# Patient Record
Sex: FEMALE | Race: WHITE | HISPANIC OR LATINO | Employment: UNEMPLOYED | URBAN - METROPOLITAN AREA
[De-identification: names, ages, dates, MRNs, and addresses within clinical notes are randomized per-mention and may not be internally consistent; named-entity substitution may affect disease eponyms.]

---

## 2023-04-18 ENCOUNTER — ROUTINE PRENATAL (OUTPATIENT)
Dept: OBSTETRICS AND GYNECOLOGY | Facility: CLINIC | Age: 33
End: 2023-04-18
Payer: COMMERCIAL

## 2023-04-18 VITALS — SYSTOLIC BLOOD PRESSURE: 118 MMHG | DIASTOLIC BLOOD PRESSURE: 76 MMHG | WEIGHT: 202.8 LBS

## 2023-04-18 DIAGNOSIS — Z3A.12 12 WEEKS GESTATION OF PREGNANCY: ICD-10-CM

## 2023-04-18 DIAGNOSIS — Z34.81 PRENATAL CARE, SUBSEQUENT PREGNANCY IN FIRST TRIMESTER: Primary | ICD-10-CM

## 2023-04-18 LAB
GLUCOSE UR STRIP-MCNC: NEGATIVE MG/DL
PROT UR STRIP-MCNC: ABNORMAL MG/DL

## 2023-04-18 PROCEDURE — 0502F SUBSEQUENT PRENATAL CARE: CPT | Performed by: OBSTETRICS & GYNECOLOGY

## 2023-04-18 NOTE — PROGRESS NOTES
OB follow up     Jocelyn Luna is a 32 y.o.  12w4d being seen today for her obstetrical visit.  Patient reports no complaints. Fetal movement: Too early.    Her prenatal care is complicated by (and status): History of gestational diabetes, Rh-, rubella nonimmune    Review of Systems  Cramping/contractions : Occasional but no bleeding  Vaginal bleeding: Negative  Fetal movement too early    /76   Wt 92 kg (202 lb 12.8 oz)   LMP 2023     FHT:  162 BPM   Uterine Size: size equals dates       Assessment    Diagnoses and all orders for this visit:    1. Prenatal care, subsequent pregnancy in first trimester (Primary)  -     IgnapafF42 PLUS Core (chr21,18,13,sex) - Blood,    2. 12 weeks gestation of pregnancy  -     CepgooaY51 PLUS Core (chr21,18,13,sex) - Blood,        1) pregnancy at 12w4d         Plan    Reviewed this stage of pregnancy  Problem list updated   Follow up in 4 weeks.  We will get NIPT today.    Thomas Conley MD   2023  12:14 EDT

## 2023-04-22 LAB
CFDNA.FET/CFDNA.TOTAL SFR FETUS: NORMAL %
CITATION REF LAB TEST: NORMAL
FET 13+18+21+X+Y ANEUP PLAS.CFDNA: NEGATIVE
FET CHR 21 TS PLAS.CFDNA QL: NEGATIVE
FET SEX PLAS.CFDNA DOSAGE CFDNA: NORMAL
FET TS 13 RISK PLAS.CFDNA QL: NEGATIVE
FET TS 18 RISK WBC.DNA+CFDNA QL: NEGATIVE
GA EST FROM CONCEPTION DATE: NORMAL D
GESTATIONAL AGE > 9:: YES
LAB DIRECTOR NAME PROVIDER: NORMAL
LAB DIRECTOR NAME PROVIDER: NORMAL
LABORATORY COMMENT REPORT: NORMAL
LIMITATIONS OF THE TEST: NORMAL
NEGATIVE PREDICTIVE VALUE: NORMAL
NOTE: NORMAL
PERFORMANCE CHARACTERISTICS: NORMAL
POSITIVE PREDICTIVE VALUE: NORMAL
REF LAB TEST METHOD: NORMAL
TEST PERFORMANCE INFO SPEC: NORMAL

## 2023-04-25 ENCOUNTER — TELEPHONE (OUTPATIENT)
Dept: OBSTETRICS AND GYNECOLOGY | Facility: CLINIC | Age: 33
End: 2023-04-25
Payer: MEDICAID

## 2023-04-25 DIAGNOSIS — Z14.1 CYSTIC FIBROSIS CARRIER: Primary | ICD-10-CM

## 2023-04-25 DIAGNOSIS — Z14.8 CARRIER OF SPINAL MUSCULAR ATROPHY: ICD-10-CM

## 2023-04-25 NOTE — TELEPHONE ENCOUNTER
Pt is calling about inheritest core panel results she feels are wrong. Patient says in her last pregnancy she was not a carrier of cystic fibrosis or spinal muscular atrophy and would like to know if there is a possibility she could have this test repeat. She is requesting to discuss, pt says she cannot bring baby into world if there is a chance of cystic fibrosis or spinal muscular atrophy.     Please advise,   Thank you

## 2023-04-25 NOTE — TELEPHONE ENCOUNTER
I looked at her record from [facility] back in 2020, she did not have the inheritest completed. Did you want to talk with her prior to repeating the test?

## 2023-04-27 ENCOUNTER — TELEPHONE (OUTPATIENT)
Dept: OBSTETRICS AND GYNECOLOGY | Facility: CLINIC | Age: 33
End: 2023-04-27
Payer: MEDICAID

## 2023-04-27 ENCOUNTER — ROUTINE PRENATAL (OUTPATIENT)
Dept: OBSTETRICS AND GYNECOLOGY | Facility: CLINIC | Age: 33
End: 2023-04-27
Payer: MEDICAID

## 2023-04-27 VITALS — SYSTOLIC BLOOD PRESSURE: 118 MMHG | DIASTOLIC BLOOD PRESSURE: 84 MMHG | WEIGHT: 202.4 LBS

## 2023-04-27 DIAGNOSIS — Z34.82 PRENATAL CARE, SUBSEQUENT PREGNANCY IN SECOND TRIMESTER: Primary | ICD-10-CM

## 2023-04-27 DIAGNOSIS — Z14.1 CYSTIC FIBROSIS CARRIER: ICD-10-CM

## 2023-04-27 DIAGNOSIS — Z14.8 CARRIER OF SPINAL MUSCULAR ATROPHY: ICD-10-CM

## 2023-04-27 PROCEDURE — 0502F SUBSEQUENT PRENATAL CARE: CPT | Performed by: OBSTETRICS & GYNECOLOGY

## 2023-04-27 NOTE — TELEPHONE ENCOUNTER
Called [facility] genetic counseling at  [phone] to schedule appt for patient and FOB. Left voicemail with all information.    [facility] to call office and patient back within 24 hours.

## 2023-04-27 NOTE — PROGRESS NOTES
CC: Lab result counseling  Patient and her partner are seen with the aid of a video .  Cystic fibrosis carrier screening and SMA carrier screening was performed and patient is positive for both of these carrier status.  She is negative for Fragile X.  I explained to her the general nature of autosomal recessive traits and how they are past and general population carrier frequencies.  I explained to her that she is not unhealthy and that her partner would have about a 1 in 25 to 1 in 50 risk of being a carrier for either or both of these conditions.  The recommendation is for her partner to be tested to see if he is a carrier for either of these and to then direct formal counseling in that regard.  If he is not a carrier, then there is no chance of passage to offspring of the condition but that her offspring could still be carriers.  She voices understanding of this and we are working on getting her partner tested.

## 2023-05-01 ENCOUNTER — TELEPHONE (OUTPATIENT)
Dept: OBSTETRICS AND GYNECOLOGY | Facility: CLINIC | Age: 33
End: 2023-05-01

## 2023-05-01 RX ORDER — ONDANSETRON 4 MG/1
4 TABLET, FILM COATED ORAL DAILY PRN
Qty: 30 TABLET | Refills: 1 | Status: SHIPPED | OUTPATIENT
Start: 2023-05-01 | End: 2024-04-30

## 2023-05-01 NOTE — TELEPHONE ENCOUNTER
Provider: DR. LANCASTER  Caller: MARKEL BARRIOS  Relationship to Patient: SELF  Pharmacy:   [id number] - [place], [place] - [address] AT [facility] [address] [facility] - [phone]  - [phone] FX Phone:  [phone]   Fax:  [phone]        PHONE NUMBER: [phone]  Reason for Call: PT REQUESTING MEDICINE FOR NAUSEA.  When was the patient last seen: 4-27

## 2023-05-01 NOTE — TELEPHONE ENCOUNTER
Patient called Friday to the number provided for Gloria Colunga and left message. Left 2 messages again today. Not returning her call.

## 2023-05-16 ENCOUNTER — ROUTINE PRENATAL (OUTPATIENT)
Dept: OBSTETRICS AND GYNECOLOGY | Facility: CLINIC | Age: 33
End: 2023-05-16
Payer: COMMERCIAL

## 2023-05-16 VITALS
DIASTOLIC BLOOD PRESSURE: 72 MMHG | WEIGHT: 202.8 LBS | SYSTOLIC BLOOD PRESSURE: 132 MMHG | HEIGHT: 65 IN | BODY MASS INDEX: 33.79 KG/M2

## 2023-05-16 DIAGNOSIS — Z3A.16 16 WEEKS GESTATION OF PREGNANCY: ICD-10-CM

## 2023-05-16 DIAGNOSIS — Z14.8 CARRIER OF SPINAL MUSCULAR ATROPHY: ICD-10-CM

## 2023-05-16 DIAGNOSIS — Z34.82 PRENATAL CARE, SUBSEQUENT PREGNANCY IN SECOND TRIMESTER: Primary | ICD-10-CM

## 2023-05-16 DIAGNOSIS — Z14.1 CYSTIC FIBROSIS CARRIER: ICD-10-CM

## 2023-05-16 LAB
GLUCOSE UR STRIP-MCNC: NEGATIVE MG/DL
PROT UR STRIP-MCNC: NEGATIVE MG/DL

## 2023-05-16 NOTE — PROGRESS NOTES
"OB follow up     Jocelyn Lyon is a 32 y.o.  16w4d being seen today for her obstetrical visit.  Patient reports no complaints. Fetal movement: normal.  No complaints today.  Her  did get screening tests at [facility] and they are still pending.    Her prenatal care is complicated by (and status): CF carrier and SMA carrier    Review of Systems  Cramping/contractions : Negative  Vaginal bleeding: Negative  Fetal movement normal    /72   Ht 165.1 cm (65\")   Wt 92 kg (202 lb 12.8 oz)   LMP 2023   BMI 33.75 kg/m²     FHT:  140 BPM   Uterine Size: size equals dates       Assessment    Diagnoses and all orders for this visit:    1. Prenatal care, subsequent pregnancy in second trimester (Primary)  -     POC Urinalysis Dipstick    2. 16 weeks gestation of pregnancy  -     POC Urinalysis Dipstick    3. Cystic fibrosis carrier    4. Carrier of spinal muscular atrophy        1) pregnancy at 16w4d         Plan    Reviewed this stage of pregnancy  Problem list updated   Follow up in 4 weeks for her anatomy ultrasound.    Thomas Conley MD   2023  10:11 EDT    "

## 2023-05-22 ENCOUNTER — TELEPHONE (OUTPATIENT)
Dept: OBSTETRICS AND GYNECOLOGY | Facility: CLINIC | Age: 33
End: 2023-05-22
Payer: COMMERCIAL

## 2023-05-22 NOTE — TELEPHONE ENCOUNTER
Returned patient's call with [place] interpter to let her know that her 's blood work for the [facility] is still pending.     Informed her, I will call with results as soon as they are ready.

## 2023-05-22 NOTE — TELEPHONE ENCOUNTER
Caller: Jocelyn Horn    Relationship to patient: Self    Best call back number: [phone]    Patient is needing:     PT IS WANTING HER HUSBANDS LAB RESULTS (MRN [id number] ANDREY ENRIQUE)    PT STATES THEY HAVE BEEN WAITING  TWO WEEKS    PLEASE ADVISE     NEEDED  OKAY TO CALL ANY TIME  OKAY TO M

## 2023-06-13 ENCOUNTER — ROUTINE PRENATAL (OUTPATIENT)
Dept: OBSTETRICS AND GYNECOLOGY | Facility: CLINIC | Age: 33
End: 2023-06-13
Payer: COMMERCIAL

## 2023-06-13 VITALS — SYSTOLIC BLOOD PRESSURE: 114 MMHG | DIASTOLIC BLOOD PRESSURE: 72 MMHG | BODY MASS INDEX: 33.78 KG/M2 | WEIGHT: 203 LBS

## 2023-06-13 DIAGNOSIS — Z14.1 CYSTIC FIBROSIS CARRIER: ICD-10-CM

## 2023-06-13 DIAGNOSIS — Z14.8 CARRIER OF SPINAL MUSCULAR ATROPHY: ICD-10-CM

## 2023-06-13 DIAGNOSIS — Z3A.20 20 WEEKS GESTATION OF PREGNANCY: ICD-10-CM

## 2023-06-13 DIAGNOSIS — Z34.82 PRENATAL CARE, SUBSEQUENT PREGNANCY, SECOND TRIMESTER: Primary | ICD-10-CM

## 2023-06-13 LAB
GLUCOSE UR STRIP-MCNC: NEGATIVE MG/DL
PROT UR STRIP-MCNC: NEGATIVE MG/DL

## 2023-06-13 NOTE — PROGRESS NOTES
OB follow up     Jocelyn Lyon is a 32 y.o.  20w4d being seen today for her obstetrical visit.  Patient reports no complaints. Fetal movement: normal.  Ultrasound today with growth consistent with 19 weeks 4 days and suboptimal visualization of the face to be repeated on next visit.    Her prenatal care is complicated by (and status): Rh-, CF carrier    Review of Systems  Cramping/contractions : Negative  Vaginal bleeding: Negative  Fetal movement normal    /72   Wt 92.1 kg (203 lb)   LMP 2023   BMI 33.78 kg/m²     FHT: 152 BPM   Uterine Size: size equals dates       Assessment    Diagnoses and all orders for this visit:    1. Prenatal care, subsequent pregnancy, second trimester (Primary)  -     POC Urinalysis Dipstick    2. 20 weeks gestation of pregnancy    3. Carrier of spinal muscular atrophy    4. Cystic fibrosis carrier        1) pregnancy at 20w4d         Plan    Reviewed this stage of pregnancy  Problem list updated   Follow up in 4 weeks and we will do follow-up ultrasound of the face and do her 1 hour glucose screen on that visit.    Thomas Conley MD   2023  10:42 EDT

## 2023-07-12 PROBLEM — O26.899 RH NEGATIVE, ANTEPARTUM: Status: ACTIVE | Noted: 2023-07-12

## 2023-07-12 PROBLEM — Z67.91 RH NEGATIVE, ANTEPARTUM: Status: ACTIVE | Noted: 2023-07-12

## 2023-07-28 ENCOUNTER — TELEPHONE (OUTPATIENT)
Dept: OBSTETRICS AND GYNECOLOGY | Facility: CLINIC | Age: 33
End: 2023-07-28
Payer: COMMERCIAL

## 2023-08-09 ENCOUNTER — ROUTINE PRENATAL (OUTPATIENT)
Dept: OBSTETRICS AND GYNECOLOGY | Facility: CLINIC | Age: 33
End: 2023-08-09
Payer: COMMERCIAL

## 2023-08-09 VITALS — DIASTOLIC BLOOD PRESSURE: 80 MMHG | WEIGHT: 207.8 LBS | SYSTOLIC BLOOD PRESSURE: 128 MMHG | BODY MASS INDEX: 34.58 KG/M2

## 2023-08-09 DIAGNOSIS — O26.893 RH NEGATIVE STATUS DURING PREGNANCY IN THIRD TRIMESTER: ICD-10-CM

## 2023-08-09 DIAGNOSIS — Z3A.28 28 WEEKS GESTATION OF PREGNANCY: Primary | ICD-10-CM

## 2023-08-09 DIAGNOSIS — Z67.91 RH NEGATIVE STATUS DURING PREGNANCY IN THIRD TRIMESTER: ICD-10-CM

## 2023-08-09 LAB
ERYTHROCYTE [DISTWIDTH] IN BLOOD BY AUTOMATED COUNT: 12.5 % (ref 12.3–15.4)
GLUCOSE UR STRIP-MCNC: NEGATIVE MG/DL
HCT VFR BLD AUTO: 34.2 % (ref 34–46.6)
HGB BLD-MCNC: 11.4 G/DL (ref 12–15.9)
MCH RBC QN AUTO: 30 PG (ref 26.6–33)
MCHC RBC AUTO-ENTMCNC: 33.3 G/DL (ref 31.5–35.7)
MCV RBC AUTO: 90 FL (ref 79–97)
PLATELET # BLD AUTO: 293 10*3/MM3 (ref 140–450)
PROT UR STRIP-MCNC: NEGATIVE MG/DL
RBC # BLD AUTO: 3.8 10*6/MM3 (ref 3.77–5.28)
WBC # BLD AUTO: 11.14 10*3/MM3 (ref 3.4–10.8)

## 2023-08-09 NOTE — PROGRESS NOTES
Chief Complaint   Patient presents with    Routine Prenatal Visit     28w5d      Jocelyn Lyon is a 32 y.o.  at 28w5d  here for routine OB visit  Reports that she is still being asked to pay a large bill from "[employer]"    /80   Wt 94.3 kg (207 lb 12.8 oz)   LMP 2023   Breastfeeding No   BMI 34.58 kg/mý    Gen: NAD, well appearing  Abd: nontender    See OB Flowsheet    ASSESSMENT/PLAN:  (Z3A.28) 28 weeks gestation of pregnancy - Plan: POC Urinalysis Dipstick, CBC (No Diff), Antibody Screen    (O26.893,  Z67.91) Rh negative status during pregnancy in third trimester - Plan: Antibody Screen    Will get AB screen and CBC prior to Rhogam.  She was walked to "[employer]" and I explained that they have billing questions to phlebotomist.      Return in about 2 weeks (around 2023).

## 2023-08-10 LAB — BLD GP AB SCN SERPL QL: NEGATIVE

## 2023-08-18 ENCOUNTER — TELEPHONE (OUTPATIENT)
Dept: OBSTETRICS AND GYNECOLOGY | Facility: CLINIC | Age: 33
End: 2023-08-18
Payer: COMMERCIAL

## 2023-08-18 ENCOUNTER — HOSPITAL ENCOUNTER (EMERGENCY)
Facility: HOSPITAL | Age: 33
Discharge: HOME | End: 2023-08-18
Attending: OBSTETRICS & GYNECOLOGY | Admitting: OBSTETRICS & GYNECOLOGY
Payer: COMMERCIAL

## 2023-08-18 VITALS
SYSTOLIC BLOOD PRESSURE: 125 MMHG | HEIGHT: 66 IN | OXYGEN SATURATION: 97 % | BODY MASS INDEX: 33.27 KG/M2 | WEIGHT: 207 LBS | TEMPERATURE: 98.9 F | HEART RATE: 116 BPM | DIASTOLIC BLOOD PRESSURE: 67 MMHG | RESPIRATION RATE: 18 BRPM

## 2023-08-18 PROCEDURE — 99283 EMERGENCY DEPT VISIT LOW MDM: CPT | Performed by: OBSTETRICS & GYNECOLOGY

## 2023-08-18 PROCEDURE — 59025 FETAL NON-STRESS TEST: CPT

## 2023-08-18 NOTE — NURSING NOTE
Patient given D/C instructions and verbalized understanding. Patient took belongings and ambulated off unit.

## 2023-08-18 NOTE — TELEPHONE ENCOUNTER
With a fall especially on the abdomen I would recommend going to L&D for NST and examination as if something urgent is needed this would be the ideal location. I will let L&D know, please call patient. Thanks!

## 2023-08-18 NOTE — TELEPHONE ENCOUNTER
PTS SISTER IS CALLING BACK IN - PT IS HAVING A LOT OF PAIN AND WOULD LIKE TO KNOW IF SHE CAN COME IN FOR AN U/S AND BE CHECKED OUT SHE DENIES DECREASED FETAL MOVEMENT  IS NOT PLEASE CALL PT TO ADVISE
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
## 2023-08-23 ENCOUNTER — ROUTINE PRENATAL (OUTPATIENT)
Dept: OBSTETRICS AND GYNECOLOGY | Facility: CLINIC | Age: 33
End: 2023-08-23
Payer: COMMERCIAL

## 2023-08-23 VITALS — WEIGHT: 210.4 LBS | DIASTOLIC BLOOD PRESSURE: 77 MMHG | SYSTOLIC BLOOD PRESSURE: 126 MMHG | BODY MASS INDEX: 33.96 KG/M2

## 2023-08-23 DIAGNOSIS — Z3A.30 30 WEEKS GESTATION OF PREGNANCY: Primary | ICD-10-CM

## 2023-08-23 LAB
GLUCOSE UR STRIP-MCNC: NEGATIVE MG/DL
PROT UR STRIP-MCNC: ABNORMAL MG/DL

## 2023-08-23 NOTE — PROGRESS NOTES
Chief Complaint   Patient presents with    Routine Prenatal Visit     30w5d      Jocelyn Lyon is a 32 y.o.  at 30w5d  here for routine OB visit  Reports leg pain is much better since ER visit. She has had no vaginal bleeding or contractions    /77   Wt 95.4 kg (210 lb 6.4 oz)   LMP 2023   BMI 33.96 kg/mý        Gen: NAD, well appearing  Abd: nontender    See OB Flowsheet    ASSESSMENT/PLAN:  (Z3A.30) 30 weeks gestation of pregnancy - Plan: POC Urinalysis Dipstick    (O32.1XX0) Maternal care for breech presentation, single gestation    Still worried about bill for spouse carrier screening.  Will check with lab.  Reviewed growth US next visit for BMI  Tdap today  Routine counseling pertinent to this stage of pregnancy was reviewed including labor precautions  Return in about 2 weeks (around 2023) for growth US, OB visit.

## 2023-09-08 ENCOUNTER — ROUTINE PRENATAL (OUTPATIENT)
Dept: OBSTETRICS AND GYNECOLOGY | Facility: CLINIC | Age: 33
End: 2023-09-08
Payer: COMMERCIAL

## 2023-09-08 VITALS — WEIGHT: 210.2 LBS | BODY MASS INDEX: 33.93 KG/M2 | DIASTOLIC BLOOD PRESSURE: 79 MMHG | SYSTOLIC BLOOD PRESSURE: 112 MMHG

## 2023-09-08 DIAGNOSIS — Z3A.33 33 WEEKS GESTATION OF PREGNANCY: Primary | ICD-10-CM

## 2023-09-08 LAB
GLUCOSE UR STRIP-MCNC: NEGATIVE MG/DL
PROT UR STRIP-MCNC: NEGATIVE MG/DL

## 2023-09-08 NOTE — PROGRESS NOTES
Chief Complaint   Patient presents with    Routine Prenatal Visit     33w      Jocelyn Lyon is a 32 y.o.  at 33w0d  here for routine OB visit  Reports a reaction to Tdap causing lots of fatigue that was short in duration. I explained that this can be a side effect but she does not sound like she had an allergy - no rash/skin changes/SOA.      /79   Wt 95.3 kg (210 lb 3.2 oz)   LMP 2023   BMI 33.93 kg/m²    Fetal Heart Rate: +  Movement: Present  Presentation: Vertex   Gen: NAD, well appearing  Abd: nontender    See OB Flowsheet    ASSESSMENT/PLAN:  (Z3A.33) 33 weeks gestation of pregnancy - Plan: POC Urinalysis Dipstick    Reviewed US which is within normal limitsn and vertex  Routine counseling pertinent to this stage of pregnancy was reviewed including third trimester precautions  Follow up as scheduled

## 2023-09-12 ENCOUNTER — TELEPHONE (OUTPATIENT)
Dept: OBSTETRICS AND GYNECOLOGY | Facility: CLINIC | Age: 33
End: 2023-09-12
Payer: COMMERCIAL

## 2023-09-12 NOTE — TELEPHONE ENCOUNTER
Provider: EMILY    Caller: MARKEL BARRIOS    Relationship to Patient: SELF    Phone Number: [phone] CALL ANYTIME, IT IS OKAY TO LVM.    Reason for Call: PATIENT'S CHILD TESTED POSITIVE FOR COVID 09/12/23. PATIENT ALSO HAS A RUNNY NOSE AND COUGH. PATIENT HAS NOT TAKEN A COVID TEST BUT HAS THE SAME SYMPTOMS AS CHILD WHO TESTED POSITIVE. PATIENT DOES NOT HAVE A FEVER.    PATIENT IS REQUESTING TO SPEAK WITH CLINICAL TO DISCUSS IF APPT ON 09/20/23 NEEDS TO BE RESCHEDULED. ARE THERE ANY CONCERNS TO WATCH FOR WITH COVID WHILE PREGNANT?

## 2023-09-18 ENCOUNTER — TELEPHONE (OUTPATIENT)
Dept: OBSTETRICS AND GYNECOLOGY | Facility: CLINIC | Age: 33
End: 2023-09-18
Payer: COMMERCIAL

## 2023-09-18 NOTE — TELEPHONE ENCOUNTER
Tried to contact patient with  regarding [facility] bill. Left voicemail for patient to return call to office.     with [facility] should be correcting the bill/invoice.

## 2023-09-20 ENCOUNTER — ROUTINE PRENATAL (OUTPATIENT)
Dept: OBSTETRICS AND GYNECOLOGY | Facility: CLINIC | Age: 33
End: 2023-09-20
Payer: COMMERCIAL

## 2023-09-20 VITALS — BODY MASS INDEX: 33.99 KG/M2 | DIASTOLIC BLOOD PRESSURE: 75 MMHG | SYSTOLIC BLOOD PRESSURE: 112 MMHG | WEIGHT: 210.6 LBS

## 2023-09-20 DIAGNOSIS — Z3A.34 34 WEEKS GESTATION OF PREGNANCY: Primary | ICD-10-CM

## 2023-09-20 LAB
GLUCOSE UR STRIP-MCNC: NEGATIVE MG/DL
PROT UR STRIP-MCNC: NEGATIVE MG/DL

## 2023-09-20 NOTE — PROGRESS NOTES
Chief Complaint   Patient presents with    Routine Prenatal Visit     34w5d      Jocelyn Lyon is a 32 y.o.  at 34w5d  here for routine OB visit  Reports that she has had some contractions, not super frequent - very spaced out  Her baby had COVID 2 weeks ago, everyone is feeling better. She never had a fever    /75   Wt 95.5 kg (210 lb 9.6 oz)   LMP 2023   BMI 33.99 kg/m²    Fetal Heart Rate: +  Movement: Present   Gen: NAD, well appearing  Abd: nontender    See OB Flowsheet    ASSESSMENT/PLAN:  (Z3A.34) 34 weeks gestation of pregnancy - Plan: POC Urinalysis Dipstick    Routine counseling pertinent to this stage of pregnancy was reviewed including  labor precautions, infection precautions  Return in about 1 week (around 2023).

## 2023-09-27 ENCOUNTER — ROUTINE PRENATAL (OUTPATIENT)
Dept: OBSTETRICS AND GYNECOLOGY | Facility: CLINIC | Age: 33
End: 2023-09-27
Payer: COMMERCIAL

## 2023-09-27 VITALS — WEIGHT: 210 LBS | DIASTOLIC BLOOD PRESSURE: 79 MMHG | SYSTOLIC BLOOD PRESSURE: 125 MMHG | BODY MASS INDEX: 33.89 KG/M2

## 2023-09-27 DIAGNOSIS — Z3A.35 35 WEEKS GESTATION OF PREGNANCY: Primary | ICD-10-CM

## 2023-09-27 DIAGNOSIS — R63.8 DECELERATION IN WEIGHT GAIN: ICD-10-CM

## 2023-09-27 NOTE — PROGRESS NOTES
Chief Complaint   Patient presents with    Routine Prenatal Visit     35w5d      Jocelyn Lyon is a 32 y.o.  at 35w5d  here for routine OB visit  Reports that she has not had increased weight since 30 weeks  Feels contractions a couple times per day    /79   Wt 95.3 kg (210 lb)   LMP 2023   BMI 33.89 kg/m²    Fetal Heart Rate: 155  Movement: Present  Presentation: Vertex   Gen: NAD, well appearing  Abd: nontender    See OB Flowsheet    ASSESSMENT/PLAN:  (Z3A.35) 35 weeks gestation of pregnancy - Plan: POC Urinalysis Dipstick, US Ob Follow Up Transabdominal Approach    (R63.8) Deceleration in weight gain - Plan: US Ob Follow Up Transabdominal Approach    Reviewed growth US was within normal limits earlier this month  Can repeat in early October  Declined flu as she had a reaction to Tdap of fatigue  Routine counseling pertinent to this stage of pregnancy was reviewed including labor precuations  Return in about 1 week (around 10/4/2023) for growth US, OB visit.

## 2023-10-04 ENCOUNTER — ROUTINE PRENATAL (OUTPATIENT)
Dept: OBSTETRICS AND GYNECOLOGY | Facility: CLINIC | Age: 33
End: 2023-10-04
Payer: COMMERCIAL

## 2023-10-04 VITALS — SYSTOLIC BLOOD PRESSURE: 126 MMHG | BODY MASS INDEX: 34.12 KG/M2 | WEIGHT: 211.4 LBS | DIASTOLIC BLOOD PRESSURE: 83 MMHG

## 2023-10-04 DIAGNOSIS — Z3A.36 36 WEEKS GESTATION OF PREGNANCY: Primary | ICD-10-CM

## 2023-10-04 LAB
GLUCOSE UR STRIP-MCNC: NEGATIVE MG/DL
PROT UR STRIP-MCNC: NEGATIVE MG/DL

## 2023-10-04 NOTE — PROGRESS NOTES
Chief Complaint   Patient presents with    Routine Prenatal Visit     36w5d      Jocelyn Lyon is a 32 y.o.  at 36w5d  here for routine OB visit  Reports some irregular contractions    /83   Wt 95.9 kg (211 lb 6.4 oz)   LMP 2023   BMI 34.12 kg/m²    Fetal Heart Rate: +  Movement: Present  Presentation: Vertex  Dilation: Closed   Gen: NAD, well appearing  Abd: nontender  Pelvic: normal external genitalia, normal vagina    See OB Flowsheet    ASSESSMENT/PLAN:  (Z3A.36) 36 weeks gestation of pregnancy - Plan: POC Urinalysis Dipstick, Strep B Screen - Swab, Vaginal/Rectum  Reviewed GBS swab, growth US within normal limits   Routine counseling pertinent to this stage of pregnancy was reviewed including labor precautions  Return in about 1 week (around 10/11/2023).

## 2023-10-06 LAB — GP B STREP DNA SPEC QL NAA+PROBE: NEGATIVE

## 2023-10-11 ENCOUNTER — ROUTINE PRENATAL (OUTPATIENT)
Dept: OBSTETRICS AND GYNECOLOGY | Facility: CLINIC | Age: 33
End: 2023-10-11
Payer: COMMERCIAL

## 2023-10-11 VITALS — DIASTOLIC BLOOD PRESSURE: 82 MMHG | SYSTOLIC BLOOD PRESSURE: 116 MMHG | WEIGHT: 213 LBS | BODY MASS INDEX: 34.38 KG/M2

## 2023-10-11 DIAGNOSIS — Z3A.37 37 WEEKS GESTATION OF PREGNANCY: Primary | ICD-10-CM

## 2023-10-11 DIAGNOSIS — R03.0 ELEVATED BLOOD PRESSURE READING: ICD-10-CM

## 2023-10-11 LAB
GLUCOSE UR STRIP-MCNC: NEGATIVE MG/DL
PROT UR STRIP-MCNC: ABNORMAL MG/DL

## 2023-10-11 NOTE — PROGRESS NOTES
Chief Complaint   Patient presents with    Routine Prenatal Visit     37w5d      Jocelyn Lyon is a 32 y.o.  at 37w5d  here for routine OB visit  Reports no major issues. She denies HA/vision changes.  Reports some swelling in her hands and feet yesterday after walking and doing a lot of house chores but otherwise doing well.       /82   Wt 96.6 kg (213 lb)   LMP 2023   BMI 34.38 kg/mý    Fetal Heart Rate: 130  Movement: Present  Dilation: 1   Gen: NAD, well appearing  Abd: nontender  Pelvic: normal external genitalia     See OB Flowsheet    ASSESSMENT/PLAN:  (Z3A.37) 37 weeks gestation of pregnancy - Plan: POC Urinalysis Dipstick, CBC (No Diff), Comprehensive Metabolic Panel, Protein / Creatinine Ratio, Urine - Urine, Clean Catch    (R03.0) Elevated blood pressure reading    Reviewed preeclampsia precautions with patient. She had an isolated elevated systolic that was 116/82 on repeat.  We will get labs  Reviewed number for [facility]  Routine counseling pertinent to this stage of pregnancy was reviewed including labor precautions, aware GBS was negative  Return in about 1 week (around 10/18/2023).

## 2023-10-12 LAB
ALBUMIN SERPL-MCNC: 3.7 G/DL (ref 3.5–5.2)
ALBUMIN/GLOB SERPL: 1.6 G/DL
ALP SERPL-CCNC: 159 U/L (ref 39–117)
ALT SERPL-CCNC: 23 U/L (ref 1–33)
AST SERPL-CCNC: 20 U/L (ref 1–32)
BILIRUB SERPL-MCNC: 0.2 MG/DL (ref 0–1.2)
BUN SERPL-MCNC: 11 MG/DL (ref 6–20)
BUN/CREAT SERPL: 13.8 (ref 7–25)
CALCIUM SERPL-MCNC: 9.4 MG/DL (ref 8.6–10.5)
CHLORIDE SERPL-SCNC: 105 MMOL/L (ref 98–107)
CO2 SERPL-SCNC: 25.7 MMOL/L (ref 22–29)
CREAT SERPL-MCNC: 0.8 MG/DL (ref 0.57–1)
CREAT UR-MCNC: 85.5 MG/DL
EGFRCR SERPLBLD CKD-EPI 2021: 101.8 ML/MIN/1.73
ERYTHROCYTE [DISTWIDTH] IN BLOOD BY AUTOMATED COUNT: 13 % (ref 12.3–15.4)
GLOBULIN SER CALC-MCNC: 2.3 GM/DL
GLUCOSE SERPL-MCNC: 111 MG/DL (ref 65–99)
HCT VFR BLD AUTO: 38.4 % (ref 34–46.6)
HGB BLD-MCNC: 13 G/DL (ref 12–15.9)
MCH RBC QN AUTO: 29.7 PG (ref 26.6–33)
MCHC RBC AUTO-ENTMCNC: 33.9 G/DL (ref 31.5–35.7)
MCV RBC AUTO: 87.9 FL (ref 79–97)
PLATELET # BLD AUTO: 259 10*3/MM3 (ref 140–450)
POTASSIUM SERPL-SCNC: 4.1 MMOL/L (ref 3.5–5.2)
PROT SERPL-MCNC: 6 G/DL (ref 6–8.5)
PROT UR-MCNC: 24.6 MG/DL
PROT/CREAT UR: 287.7 MG/G CREA (ref 0–200)
RBC # BLD AUTO: 4.37 10*6/MM3 (ref 3.77–5.28)
SODIUM SERPL-SCNC: 139 MMOL/L (ref 136–145)
WBC # BLD AUTO: 10.24 10*3/MM3 (ref 3.4–10.8)

## 2023-10-18 ENCOUNTER — ROUTINE PRENATAL (OUTPATIENT)
Dept: OBSTETRICS AND GYNECOLOGY | Facility: CLINIC | Age: 33
End: 2023-10-18
Payer: COMMERCIAL

## 2023-10-18 VITALS — SYSTOLIC BLOOD PRESSURE: 131 MMHG | WEIGHT: 213.2 LBS | DIASTOLIC BLOOD PRESSURE: 84 MMHG | BODY MASS INDEX: 34.41 KG/M2

## 2023-10-18 DIAGNOSIS — Z3A.38 38 WEEKS GESTATION OF PREGNANCY: Primary | ICD-10-CM

## 2023-10-18 LAB
GLUCOSE UR STRIP-MCNC: NEGATIVE MG/DL
PROT UR STRIP-MCNC: NEGATIVE MG/DL

## 2023-10-18 RX ORDER — SODIUM CHLORIDE 0.9 % (FLUSH) 0.9 %
10 SYRINGE (ML) INJECTION EVERY 12 HOURS SCHEDULED
OUTPATIENT
Start: 2023-10-18

## 2023-10-18 RX ORDER — CARBOPROST TROMETHAMINE 250 UG/ML
250 INJECTION, SOLUTION INTRAMUSCULAR AS NEEDED
OUTPATIENT
Start: 2023-10-18

## 2023-10-18 RX ORDER — SODIUM CHLORIDE 9 MG/ML
40 INJECTION, SOLUTION INTRAVENOUS AS NEEDED
OUTPATIENT
Start: 2023-10-18

## 2023-10-18 RX ORDER — SODIUM CHLORIDE 0.9 % (FLUSH) 0.9 %
10 SYRINGE (ML) INJECTION AS NEEDED
OUTPATIENT
Start: 2023-10-18

## 2023-10-18 RX ORDER — LIDOCAINE HYDROCHLORIDE 10 MG/ML
0.5 INJECTION, SOLUTION INFILTRATION; PERINEURAL ONCE AS NEEDED
OUTPATIENT
Start: 2023-10-18

## 2023-10-18 RX ORDER — MAGNESIUM CARB/ALUMINUM HYDROX 105-160MG
30 TABLET,CHEWABLE ORAL ONCE
OUTPATIENT
Start: 2023-10-18 | End: 2023-10-18

## 2023-10-18 RX ORDER — OXYTOCIN/0.9 % SODIUM CHLORIDE 30/500 ML
999 PLASTIC BAG, INJECTION (ML) INTRAVENOUS ONCE
OUTPATIENT
Start: 2023-10-18

## 2023-10-18 RX ORDER — ACETAMINOPHEN 325 MG/1
650 TABLET ORAL EVERY 4 HOURS PRN
OUTPATIENT
Start: 2023-10-18

## 2023-10-18 RX ORDER — MISOPROSTOL 100 UG/1
25 TABLET ORAL EVERY 4 HOURS PRN
OUTPATIENT
Start: 2023-10-18

## 2023-10-18 RX ORDER — MISOPROSTOL 100 UG/1
800 TABLET ORAL AS NEEDED
OUTPATIENT
Start: 2023-10-18

## 2023-10-18 RX ORDER — METHYLERGONOVINE MALEATE 0.2 MG/ML
200 INJECTION INTRAVENOUS ONCE AS NEEDED
OUTPATIENT
Start: 2023-10-18

## 2023-10-18 RX ORDER — OXYTOCIN/0.9 % SODIUM CHLORIDE 30/500 ML
250 PLASTIC BAG, INJECTION (ML) INTRAVENOUS CONTINUOUS
OUTPATIENT
Start: 2023-10-18 | End: 2023-10-18

## 2023-10-18 RX ORDER — SODIUM CHLORIDE, SODIUM LACTATE, POTASSIUM CHLORIDE, CALCIUM CHLORIDE 600; 310; 30; 20 MG/100ML; MG/100ML; MG/100ML; MG/100ML
125 INJECTION, SOLUTION INTRAVENOUS CONTINUOUS
OUTPATIENT
Start: 2023-10-18

## 2023-10-18 NOTE — PROGRESS NOTES
Chief Complaint   Patient presents with    Routine Prenatal Visit     38w5d      Jocelyn Lyon is a 32 y.o.  at 38w5d  here for routine OB visit  Reports no issues    /84   Wt 96.7 kg (213 lb 3.2 oz)   LMP 2023   BMI 34.41 kg/m²        Gen: NAD, well appearing  Abd: nontender  Pelvic: normal ext genitalia    See OB Flowsheet    ASSESSMENT/PLAN:  (Z3A.38) 38 weeks gestation of pregnancy - Plan: POC Urinalysis Dipstick    Reviewed risks/benefits of IOL. Would like around 39-40 weeks, will schedule  Routine counseling pertinent to this stage of pregnancy was reviewed including labor precautions  Return in about 1 week (around 10/25/2023).

## 2023-10-24 ENCOUNTER — TELEPHONE (OUTPATIENT)
Dept: OBSTETRICS AND GYNECOLOGY | Facility: CLINIC | Age: 33
End: 2023-10-24
Payer: COMMERCIAL

## 2023-10-24 NOTE — TELEPHONE ENCOUNTER
Spoke with pt via  she is aware of induction at 5pm tomorrow and to call L&D at 3pm to make sure a bed is available before showing up.     Nola Perry

## 2023-10-27 ENCOUNTER — TELEPHONE (OUTPATIENT)
Dept: OBSTETRICS AND GYNECOLOGY | Facility: CLINIC | Age: 33
End: 2023-10-27

## 2023-10-27 ENCOUNTER — ROUTINE PRENATAL (OUTPATIENT)
Dept: OBSTETRICS AND GYNECOLOGY | Facility: CLINIC | Age: 33
End: 2023-10-27
Payer: COMMERCIAL

## 2023-10-27 VITALS — WEIGHT: 213 LBS | BODY MASS INDEX: 34.38 KG/M2 | SYSTOLIC BLOOD PRESSURE: 120 MMHG | DIASTOLIC BLOOD PRESSURE: 83 MMHG

## 2023-10-27 DIAGNOSIS — Z34.80 SUPERVISION OF OTHER NORMAL PREGNANCY, ANTEPARTUM: Primary | ICD-10-CM

## 2023-10-27 NOTE — TELEPHONE ENCOUNTER
"Cale-  Can you please reach out to her for me? I just spoke with the hospital again. Unfortunately they are very full still and have no estimated time of when they will be having room.  I had recommended that she come in for a visit this week in case this happens, but it appears it was cancelled (says \"(Patient)\"). If she would like to come in before 3 today I will [facility].  Please let her know I will not be in the hospital this weekend.  She is welcome to stay \"in line\" for her induction, but I can also see if they have space/time next Wednesday as I will be in the hospital on Thursday.  Thanks!  "

## 2023-10-27 NOTE — TELEPHONE ENCOUNTER
----- Message from Jocelyn Lyon sent at 10/27/2023  1:10 AM EDT -----  Regarding: Hello  Contact: [phone]  Mckayla, good evening, this is your patient, Jocelyn Luna. I am very worried about the issue of my labor induction. To date, the hospital has not called me and that is not a normal thing. I hope you respond when you can and make a new appointment to see me. I don't know how. My baby is here because I don't check myself this week, thank you and have a nice night.

## 2023-10-27 NOTE — PROGRESS NOTES
Chief Complaint   Patient presents with    Routine Prenatal Visit      Jocelyn Lyon is a 32 y.o.  at 40w0d  here for routine OB visit  Reports that she would prefer to defer her induction until next week if she has no other indication.   Would like ultrasound today to check fluid    /83   Wt 96.6 kg (213 lb)   LMP 2023   BMI 34.38 kg/m²        Gen: NAD, well appearing  Abd: nontender    See OB Flowsheet    ASSESSMENT/PLAN:  (Z34.80) Supervision of other normal pregnancy, antepartum    BPP today   Reviewed labor and fetal movement precautions.   Plan for IOL Wednesday AM at 7AM unless earlier indication arises.    No follow-ups on file.

## 2023-11-01 ENCOUNTER — ANESTHESIA (OUTPATIENT)
Dept: LABOR AND DELIVERY | Facility: HOSPITAL | Age: 33
End: 2023-11-01
Payer: COMMERCIAL

## 2023-11-01 ENCOUNTER — HOSPITAL ENCOUNTER (INPATIENT)
Facility: HOSPITAL | Age: 33
LOS: 2 days | Discharge: HOME | End: 2023-11-03
Attending: OBSTETRICS & GYNECOLOGY | Admitting: OBSTETRICS & GYNECOLOGY
Payer: COMMERCIAL

## 2023-11-01 ENCOUNTER — HOSPITAL ENCOUNTER (INPATIENT)
Dept: LABOR AND DELIVERY | Facility: HOSPITAL | Age: 33
Discharge: HOME | End: 2023-11-01
Payer: COMMERCIAL

## 2023-11-01 ENCOUNTER — ANESTHESIA EVENT (OUTPATIENT)
Dept: LABOR AND DELIVERY | Facility: HOSPITAL | Age: 33
End: 2023-11-01
Payer: COMMERCIAL

## 2023-11-01 DIAGNOSIS — Z3A.38 38 WEEKS GESTATION OF PREGNANCY: ICD-10-CM

## 2023-11-01 PROBLEM — Z3A.39 39 WEEKS GESTATION OF PREGNANCY: Status: ACTIVE | Noted: 2023-11-01

## 2023-11-01 LAB
ABO GROUP BLD: NORMAL
ABO GROUP BLD: NORMAL
ALBUMIN SERPL-MCNC: 3.2 G/DL (ref 3.5–5.2)
ALBUMIN/GLOB SERPL: 1.1 G/DL
ALP SERPL-CCNC: 157 U/L (ref 39–117)
ALT SERPL W P-5'-P-CCNC: 23 U/L (ref 1–33)
ANION GAP SERPL CALCULATED.3IONS-SCNC: 11.3 MMOL/L (ref 5–15)
AST SERPL-CCNC: 20 U/L (ref 1–32)
BILIRUB SERPL-MCNC: 0.3 MG/DL (ref 0–1.2)
BLD GP AB SCN SERPL QL: NEGATIVE
BUN SERPL-MCNC: 9 MG/DL (ref 6–20)
BUN/CREAT SERPL: 15.8 (ref 7–25)
CALCIUM SPEC-SCNC: 9.1 MG/DL (ref 8.6–10.5)
CHLORIDE SERPL-SCNC: 104 MMOL/L (ref 98–107)
CO2 SERPL-SCNC: 19.7 MMOL/L (ref 22–29)
CREAT SERPL-MCNC: 0.57 MG/DL (ref 0.57–1)
CREAT UR-MCNC: 101.5 MG/DL
DEPRECATED RDW RBC AUTO: 41.4 FL (ref 37–54)
EGFRCR SERPLBLD CKD-EPI 2021: 125.6 ML/MIN/1.73
ERYTHROCYTE [DISTWIDTH] IN BLOOD BY AUTOMATED COUNT: 13 % (ref 12.3–15.4)
FETAL BLEED: NEGATIVE
GLOBULIN UR ELPH-MCNC: 3 GM/DL
GLUCOSE SERPL-MCNC: 124 MG/DL (ref 65–99)
HCT VFR BLD AUTO: 38.5 % (ref 34–46.6)
HGB BLD-MCNC: 13 G/DL (ref 12–15.9)
LDH SERPL-CCNC: 203 U/L (ref 135–214)
MCH RBC QN AUTO: 29.3 PG (ref 26.6–33)
MCHC RBC AUTO-ENTMCNC: 33.8 G/DL (ref 31.5–35.7)
MCV RBC AUTO: 86.9 FL (ref 79–97)
NUMBER OF DOSES: NORMAL
PLATELET # BLD AUTO: 244 10*3/MM3 (ref 140–450)
PMV BLD AUTO: 10.4 FL (ref 6–12)
POTASSIUM SERPL-SCNC: 3.4 MMOL/L (ref 3.5–5.2)
PROT ?TM UR-MCNC: 28.8 MG/DL
PROT SERPL-MCNC: 6.2 G/DL (ref 6–8.5)
PROT/CREAT UR: 283.7 MG/G CREA (ref 0–200)
RBC # BLD AUTO: 4.43 10*6/MM3 (ref 3.77–5.28)
RH BLD: NEGATIVE
RH BLD: NEGATIVE
SODIUM SERPL-SCNC: 135 MMOL/L (ref 136–145)
T&S EXPIRATION DATE: NORMAL
URATE SERPL-MCNC: 4.5 MG/DL (ref 2.4–5.7)
WBC NRBC COR # BLD: 8.74 10*3/MM3 (ref 3.4–10.8)

## 2023-11-01 PROCEDURE — 85461 HEMOGLOBIN FETAL: CPT | Performed by: OBSTETRICS & GYNECOLOGY

## 2023-11-01 PROCEDURE — 86900 BLOOD TYPING SEROLOGIC ABO: CPT | Performed by: OBSTETRICS & GYNECOLOGY

## 2023-11-01 PROCEDURE — 86901 BLOOD TYPING SEROLOGIC RH(D): CPT | Performed by: OBSTETRICS & GYNECOLOGY

## 2023-11-01 PROCEDURE — 86850 RBC ANTIBODY SCREEN: CPT | Performed by: OBSTETRICS & GYNECOLOGY

## 2023-11-01 PROCEDURE — 0HQ9XZZ REPAIR PERINEUM SKIN, EXTERNAL APPROACH: ICD-10-PCS | Performed by: OBSTETRICS & GYNECOLOGY

## 2023-11-01 PROCEDURE — 83615 LACTATE (LD) (LDH) ENZYME: CPT | Performed by: OBSTETRICS & GYNECOLOGY

## 2023-11-01 PROCEDURE — 82570 ASSAY OF URINE CREATININE: CPT | Performed by: OBSTETRICS & GYNECOLOGY

## 2023-11-01 PROCEDURE — 10907ZC DRAINAGE OF AMNIOTIC FLUID, THERAPEUTIC FROM PRODUCTS OF CONCEPTION, VIA NATURAL OR ARTIFICIAL OPENING: ICD-10-PCS | Performed by: OBSTETRICS & GYNECOLOGY

## 2023-11-01 PROCEDURE — C1755 CATHETER, INTRASPINAL: HCPCS | Performed by: STUDENT IN AN ORGANIZED HEALTH CARE EDUCATION/TRAINING PROGRAM

## 2023-11-01 PROCEDURE — 59410 OBSTETRICAL CARE: CPT | Performed by: OBSTETRICS & GYNECOLOGY

## 2023-11-01 PROCEDURE — 84550 ASSAY OF BLOOD/URIC ACID: CPT | Performed by: OBSTETRICS & GYNECOLOGY

## 2023-11-01 PROCEDURE — 25810000003 LACTATED RINGERS PER 1000 ML: Performed by: OBSTETRICS & GYNECOLOGY

## 2023-11-01 PROCEDURE — 84156 ASSAY OF PROTEIN URINE: CPT | Performed by: OBSTETRICS & GYNECOLOGY

## 2023-11-01 PROCEDURE — 80053 COMPREHEN METABOLIC PANEL: CPT | Performed by: OBSTETRICS & GYNECOLOGY

## 2023-11-01 PROCEDURE — 85027 COMPLETE CBC AUTOMATED: CPT | Performed by: OBSTETRICS & GYNECOLOGY

## 2023-11-01 RX ORDER — DOCUSATE SODIUM 100 MG/1
100 CAPSULE, LIQUID FILLED ORAL 2 TIMES DAILY
Status: DISCONTINUED | OUTPATIENT
Start: 2023-11-01 | End: 2023-11-03 | Stop reason: HOSPADM

## 2023-11-01 RX ORDER — FENTANYL CIT 0.2 MG/100ML-ROPIV 0.2%-NACL 0.9% EPIDURAL INJ 2/0.2 MCG/ML-%
10 SOLUTION INJECTION CONTINUOUS
Status: DISCONTINUED | OUTPATIENT
Start: 2023-11-01 | End: 2023-11-01

## 2023-11-01 RX ORDER — SODIUM CHLORIDE 0.9 % (FLUSH) 0.9 %
1-10 SYRINGE (ML) INJECTION AS NEEDED
Status: DISCONTINUED | OUTPATIENT
Start: 2023-11-01 | End: 2023-11-03 | Stop reason: HOSPADM

## 2023-11-01 RX ORDER — OXYTOCIN/0.9 % SODIUM CHLORIDE 30/500 ML
250 PLASTIC BAG, INJECTION (ML) INTRAVENOUS CONTINUOUS
Status: ACTIVE | OUTPATIENT
Start: 2023-11-01 | End: 2023-11-01

## 2023-11-01 RX ORDER — SODIUM CHLORIDE 0.9 % (FLUSH) 0.9 %
10 SYRINGE (ML) INJECTION EVERY 12 HOURS SCHEDULED
Status: DISCONTINUED | OUTPATIENT
Start: 2023-11-01 | End: 2023-11-01 | Stop reason: HOSPADM

## 2023-11-01 RX ORDER — BISACODYL 10 MG
10 SUPPOSITORY, RECTAL RECTAL DAILY PRN
Status: DISCONTINUED | OUTPATIENT
Start: 2023-11-02 | End: 2023-11-03 | Stop reason: HOSPADM

## 2023-11-01 RX ORDER — HYDROCORTISONE 25 MG/G
1 CREAM TOPICAL AS NEEDED
Status: DISCONTINUED | OUTPATIENT
Start: 2023-11-01 | End: 2023-11-03 | Stop reason: HOSPADM

## 2023-11-01 RX ORDER — CARBOPROST TROMETHAMINE 250 UG/ML
250 INJECTION, SOLUTION INTRAMUSCULAR AS NEEDED
Status: DISCONTINUED | OUTPATIENT
Start: 2023-11-01 | End: 2023-11-01 | Stop reason: HOSPADM

## 2023-11-01 RX ORDER — EPHEDRINE SULFATE 50 MG/ML
10 INJECTION, SOLUTION INTRAVENOUS
Status: DISCONTINUED | OUTPATIENT
Start: 2023-11-01 | End: 2023-11-01 | Stop reason: HOSPADM

## 2023-11-01 RX ORDER — SODIUM CHLORIDE 9 MG/ML
40 INJECTION, SOLUTION INTRAVENOUS AS NEEDED
Status: DISCONTINUED | OUTPATIENT
Start: 2023-11-01 | End: 2023-11-01 | Stop reason: HOSPADM

## 2023-11-01 RX ORDER — MISOPROSTOL 100 MCG
25 TABLET ORAL EVERY 4 HOURS PRN
Status: DISCONTINUED | OUTPATIENT
Start: 2023-11-01 | End: 2023-11-01

## 2023-11-01 RX ORDER — ONDANSETRON 4 MG/1
4 TABLET, FILM COATED ORAL EVERY 8 HOURS PRN
Status: DISCONTINUED | OUTPATIENT
Start: 2023-11-01 | End: 2023-11-03 | Stop reason: HOSPADM

## 2023-11-01 RX ORDER — IBUPROFEN 600 MG/1
600 TABLET ORAL EVERY 6 HOURS PRN
Status: DISCONTINUED | OUTPATIENT
Start: 2023-11-01 | End: 2023-11-03 | Stop reason: HOSPADM

## 2023-11-01 RX ORDER — ACETAMINOPHEN 325 MG/1
650 TABLET ORAL EVERY 6 HOURS PRN
Status: DISCONTINUED | OUTPATIENT
Start: 2023-11-01 | End: 2023-11-03 | Stop reason: HOSPADM

## 2023-11-01 RX ORDER — OXYTOCIN/0.9 % SODIUM CHLORIDE 30/500 ML
2-20 PLASTIC BAG, INJECTION (ML) INTRAVENOUS
Status: DISCONTINUED | OUTPATIENT
Start: 2023-11-01 | End: 2023-11-01

## 2023-11-01 RX ORDER — OXYTOCIN/0.9 % SODIUM CHLORIDE 30/500 ML
999 PLASTIC BAG, INJECTION (ML) INTRAVENOUS ONCE
Status: COMPLETED | OUTPATIENT
Start: 2023-11-01 | End: 2023-11-01

## 2023-11-01 RX ORDER — SODIUM CHLORIDE, SODIUM LACTATE, POTASSIUM CHLORIDE, CALCIUM CHLORIDE 600; 310; 30; 20 MG/100ML; MG/100ML; MG/100ML; MG/100ML
125 INJECTION, SOLUTION INTRAVENOUS CONTINUOUS
Status: DISCONTINUED | OUTPATIENT
Start: 2023-11-01 | End: 2023-11-01

## 2023-11-01 RX ORDER — MISOPROSTOL 200 UG/1
800 TABLET ORAL AS NEEDED
Status: DISCONTINUED | OUTPATIENT
Start: 2023-11-01 | End: 2023-11-01 | Stop reason: HOSPADM

## 2023-11-01 RX ORDER — HYDROXYZINE 50 MG/1
50 TABLET, FILM COATED ORAL NIGHTLY PRN
Status: DISCONTINUED | OUTPATIENT
Start: 2023-11-01 | End: 2023-11-03 | Stop reason: HOSPADM

## 2023-11-01 RX ORDER — LIDOCAINE HYDROCHLORIDE 10 MG/ML
0.5 INJECTION, SOLUTION INFILTRATION; PERINEURAL ONCE AS NEEDED
Status: DISCONTINUED | OUTPATIENT
Start: 2023-11-01 | End: 2023-11-01 | Stop reason: HOSPADM

## 2023-11-01 RX ORDER — OXYTOCIN/0.9 % SODIUM CHLORIDE 30/500 ML
125 PLASTIC BAG, INJECTION (ML) INTRAVENOUS CONTINUOUS PRN
Status: COMPLETED | OUTPATIENT
Start: 2023-11-01 | End: 2023-11-01

## 2023-11-01 RX ORDER — ERYTHROMYCIN 5 MG/G
OINTMENT OPHTHALMIC
Status: ACTIVE
Start: 2023-11-01 | End: 2023-11-02

## 2023-11-01 RX ORDER — PRENATAL VIT/IRON FUM/FOLIC AC 27MG-0.8MG
1 TABLET ORAL DAILY
Status: DISCONTINUED | OUTPATIENT
Start: 2023-11-02 | End: 2023-11-03 | Stop reason: HOSPADM

## 2023-11-01 RX ORDER — SODIUM CHLORIDE 0.9 % (FLUSH) 0.9 %
10 SYRINGE (ML) INJECTION AS NEEDED
Status: DISCONTINUED | OUTPATIENT
Start: 2023-11-01 | End: 2023-11-01 | Stop reason: HOSPADM

## 2023-11-01 RX ORDER — ACETAMINOPHEN 325 MG/1
650 TABLET ORAL EVERY 4 HOURS PRN
Status: DISCONTINUED | OUTPATIENT
Start: 2023-11-01 | End: 2023-11-01 | Stop reason: HOSPADM

## 2023-11-01 RX ORDER — HYDROCODONE BITARTRATE AND ACETAMINOPHEN 10; 325 MG/1; MG/1
1 TABLET ORAL EVERY 4 HOURS PRN
Status: DISCONTINUED | OUTPATIENT
Start: 2023-11-01 | End: 2023-11-03 | Stop reason: HOSPADM

## 2023-11-01 RX ORDER — MAGNESIUM CARB/ALUMINUM HYDROX 105-160MG
30 TABLET,CHEWABLE ORAL ONCE
Status: DISCONTINUED | OUTPATIENT
Start: 2023-11-01 | End: 2023-11-01 | Stop reason: HOSPADM

## 2023-11-01 RX ORDER — HYDROCODONE BITARTRATE AND ACETAMINOPHEN 5; 325 MG/1; MG/1
1 TABLET ORAL EVERY 4 HOURS PRN
Status: DISCONTINUED | OUTPATIENT
Start: 2023-11-01 | End: 2023-11-03 | Stop reason: HOSPADM

## 2023-11-01 RX ORDER — CALCIUM CARBONATE 500 MG/1
2 TABLET, CHEWABLE ORAL 3 TIMES DAILY PRN
Status: DISCONTINUED | OUTPATIENT
Start: 2023-11-01 | End: 2023-11-03 | Stop reason: HOSPADM

## 2023-11-01 RX ORDER — METHYLERGONOVINE MALEATE 0.2 MG/ML
200 INJECTION INTRAVENOUS ONCE AS NEEDED
Status: DISCONTINUED | OUTPATIENT
Start: 2023-11-01 | End: 2023-11-01 | Stop reason: HOSPADM

## 2023-11-01 RX ORDER — PHYTONADIONE 1 MG/.5ML
INJECTION, EMULSION INTRAMUSCULAR; INTRAVENOUS; SUBCUTANEOUS
Status: ACTIVE
Start: 2023-11-01 | End: 2023-11-02

## 2023-11-01 RX ORDER — OXYTOCIN/0.9 % SODIUM CHLORIDE 30/500 ML
125 PLASTIC BAG, INJECTION (ML) INTRAVENOUS CONTINUOUS PRN
Status: DISCONTINUED | OUTPATIENT
Start: 2023-11-01 | End: 2023-11-03 | Stop reason: HOSPADM

## 2023-11-01 RX ADMIN — IBUPROFEN 600 MG: 600 TABLET, FILM COATED ORAL at 20:57

## 2023-11-01 RX ADMIN — SODIUM CHLORIDE, POTASSIUM CHLORIDE, SODIUM LACTATE AND CALCIUM CHLORIDE 125 ML/HR: 600; 310; 30; 20 INJECTION, SOLUTION INTRAVENOUS at 08:45

## 2023-11-01 RX ADMIN — Medication 125 ML/HR: at 17:48

## 2023-11-01 RX ADMIN — Medication 999 ML/HR: at 16:42

## 2023-11-01 RX ADMIN — Medication: at 21:01

## 2023-11-01 RX ADMIN — Medication 10 ML/HR: at 13:41

## 2023-11-01 RX ADMIN — SODIUM CHLORIDE, POTASSIUM CHLORIDE, SODIUM LACTATE AND CALCIUM CHLORIDE 125 ML/HR: 600; 310; 30; 20 INJECTION, SOLUTION INTRAVENOUS at 13:37

## 2023-11-01 RX ADMIN — Medication 2 MILLI-UNITS/MIN: at 10:23

## 2023-11-01 NOTE — ANESTHESIA PROCEDURE NOTES
Labor Epidural    Pre-sedation assessment completed: 11/1/2023 1:17 PM    Patient reassessed immediately prior to procedure    Patient location during procedure: OB  Start Time: 11/1/2023 1:20 PM  Stop Time: 11/1/2023 1:33 PM  Performed By  Anesthesiologist: Wilner Villalba MD  Preanesthetic Checklist  Completed: patient identified, risks and benefits discussed and timeout performed  Prep:  Pt Position:sitting  Sterile Tech:cap, gloves, mask and sterile barrier  Prep:chlorhexidine gluconate and isopropyl alcohol  Monitoring:blood pressure monitoring, continuous pulse oximetry and EKG  Epidural Block Procedure:  Approach:midline  Guidance:landmark technique and palpation technique  Location:L3-L4  Needle Type:Tuohy  Needle Gauge:17 G  Loss of Resistance Medium: saline  Loss of Resistance: 7cm  Cath Depth at skin:12 cm  Paresthesia: none  Aspiration:negative  Test Dose:negative  Number of Attempts: 1  Post Assessment:  Dressing:occlusive dressing applied and secured with tape  Pt Tolerance:patient tolerated the procedure well with no apparent complications  Complications:no

## 2023-11-01 NOTE — ANESTHESIA PREPROCEDURE EVALUATION
Anesthesia Evaluation     Patient summary reviewed and Nursing notes reviewed   no history of anesthetic complications:   NPO Solid Status: > 8 hours  NPO Liquid Status: > 2 hours           Airway   Dental      Pulmonary    Cardiovascular         Neuro/Psych  GI/Hepatic/Renal/Endo      Musculoskeletal     Abdominal    Substance History      OB/GYN    (+) Pregnant        Other                    Anesthesia Plan    ASA 2     epidural     (40w5d)    Anesthetic plan, risks, benefits, and alternatives have been provided, discussed and informed consent has been obtained with: patient.    CODE STATUS:    Level Of Support Discussed With: Patient  Code Status (Patient has no pulse and is not breathing): CPR (Attempt to Resuscitate)  Medical Interventions (Patient has pulse or is breathing): Full Support

## 2023-11-01 NOTE — PROGRESS NOTES
OB Note    Patient without complaints.  Wants epidural soon.  VSS, afebrile.  Cervix 80/3-4/-2.  AROM with clear fluid.  IUPC placed.  FHT reassuring.  Allow epidural.    Jimmy Hsieh MD

## 2023-11-01 NOTE — H&P
Patient is a 32 y.o. female parous times one admitted for postterm induction at 40+ weeks.    Patient Active Problem List   Diagnosis    Rh negative, antepartum    39 weeks gestation of pregnancy     Prenatal care is uncomplicated.    History reviewed. No pertinent past medical history.    History reviewed. No pertinent surgical history.    No Known Allergies    Social History     Socioeconomic History    Marital status:    Tobacco Use    Smoking status: Never    Smokeless tobacco: Never   Vaping Use    Vaping Use: Never used   Substance and Sexual Activity    Alcohol use: Not Currently    Drug use: Never    Sexual activity: Yes     Partners: Male     Birth control/protection: None       Physical Exam  Gen: Alert and pleasant.  Vitals:    11/01/23 0841   BP: 120/90   Pulse: 112   Resp: 18   Temp: 98.5 °F (36.9 °C)   SpO2: 99%     HEENT: WNL  Abdomen: Gravid.  EFW 7lbs  Cervix:  70/2-3/-3  FHT: Category 1    Assessment/Plan: IUP at 40+ weeks with favorable cervix  - Patient did not tolerate pelvic exam.  Plan to allow epidural and then proceed with pitocin and amniotomy.  - Fetal tracing reassuring.    Jimmy Hsieh MD

## 2023-11-01 NOTE — PLAN OF CARE
Problem: Bleeding (Labor)  Goal: Hemostasis  Outcome: Met     Problem: Change in Fetal Wellbeing (Labor)  Goal: Stable Fetal Wellbeing  Outcome: Met     Problem: Delayed Labor Progression (Labor)  Goal: Effective Progression to Delivery  Outcome: Met     Problem: Infection (Labor)  Goal: Absence of Infection Signs and Symptoms  Outcome: Met     Problem: Labor Pain (Labor)  Goal: Acceptable Pain Control  Outcome: Met     Problem: Uterine Tachysystole (Labor)  Goal: Normal Uterine Contraction Pattern  Outcome: Met     Problem: Device-Related Complication Risk (Anesthesia/Analgesia, Neuraxial)  Goal: Safe Infusion Delivery Completion  Outcome: Met     Problem: Infection (Anesthesia/Analgesia, Neuraxial)  Goal: Absence of Infection Signs and Symptoms  Outcome: Met     Problem: Nausea and Vomiting (Anesthesia/Analgesia, Neuraxial)  Goal: Nausea and Vomiting Relief  Outcome: Met     Problem: Pain (Anesthesia/Analgesia, Neuraxial)  Goal: Effective Pain Control  Outcome: Met     Problem: Respiratory Compromise (Anesthesia/Analgesia, Neuraxial)  Goal: Effective Oxygenation and Ventilation  Outcome: Met     Problem: Sensorimotor Impairment (Anesthesia/Analgesia, Neuraxial)  Goal: Baseline Motor Function  Outcome: Met     Problem: Urinary Retention (Anesthesia/Analgesia, Neuraxial)  Goal: Effective Urinary Elimination  Outcome: Met   Goal Outcome Evaluation:

## 2023-11-01 NOTE — L&D DELIVERY NOTE
[facility]   Vaginal Delivery Note    Patient Name: Jocelyn Lyon  : 1990  MRN: [id number]    Date of Delivery: 2023     Diagnosis     Pre & Post-Delivery:  Intrauterine pregnancy at 40w5d  Labor status: Induced Onset of Labor     39 weeks gestation of pregnancy             Problem List    Transfer to Postpartum     Review the Delivery Report for details.     Delivery     Delivery: Vaginal, Spontaneous     YOB: 2021    Time of Birth:  Gestational Age 4:36 PM   40w5d     Anesthesia: Epidural     Delivering clinician: Bethany Montoya    Forceps?   No   Vacuum? No    Shoulder dystocia present: No        Delivery narrative:    Preop diagnosis:  32 y.o.  year old  at 40w5d        Postop diagnosis: Same    Indications: Jocelyn Lyon is a 32 y.o.  who presented at 40w5d with induction of labor. She progressed with pitocin and AROM along a normal labor curve to complete dilation    Findings: Female infant, Apgar 8/9, Weight pending; first degree perineal lacerations noted and repaired; Placenta intact    QBL: Quantitative Blood Loss (mL): 76 mL    Procedure:The cervix was noted to be completely dilated, completely effaced, and +4 station. Under continuous electronic fetal heart rate monitoring, the patient was encouraged to push. With good maternal effort she delivered a viable female infant. The head presented in the OA presentation and restituted to maternal right. There was no nuchal cord present. The right anterior fetal shoulder was then easily delivered with a gentle downward motion followed by the posterior fetal shoulder, followed by the remainder of the infant without difficulty. The infant was immediately vigorous. The cord was clamped 30 seconds following delivery. The cord was cut and the infant was placed to maternal abdomen. Cord blood was then collected. The placenta then delivered spontaneously intact, and a three vessel cord was noted. Uterine  "massage and IV Pitocin were given until the fundus was firm. The cervix, vagina, perineum, and rectum were carefully inspected for lacerations and a first degree was noted. This was repaired in standard fashion with 3-0 vicryl. Counts for needles, sponges, laps and instruments were correct times two at the end of the delivery. There were no sponges left in the vagina. There were no major complications. Mother and baby were bonding well at the end of the delivery.           Infant     Findings: female  infant     Infant observations: Weight: No birth weight on file.   Length:   in  Observations/Comments:  Pablo RM 4      Apgars: 8  @ 1 minute /    9  @ 5 minutes         Placenta & Cord         Placenta delivered  Spontaneous  at   11/1/2023  4:42 PM     Cord: 3 vessels  present.   Nuchal Cord?  no   Cord blood obtained: Yes    Cord gases obtained:  No    Cord gas results: Venous:  No results found for: \"PHCVEN\", \"BECVEN\"    Arterial:  No results found for: \"PHCART\", \"BECART\"     Repair     Episiotomy: None     No    Lacerations: Yes  Laceration Information  Laceration Repaired?   Perineal: 1st      Periurethral:       Labial:       Sulcus:       Vaginal:       Cervical:         Suture used for repair: 3-0 Vicryl  Laceration Length for 3rd or 4th degree lacerations: N/A   Estimated Blood Loss:       Quantitative Blood Loss: Quantitative Blood Loss (mL): 76 mL (11/01/23 1646)        Complications     none    Disposition     Mother to Mother Baby/Postpartum  in stable condition currently.  Baby to remains with mom  in stable condition currently.    Bethany Montoya MD  11/01/23  17:24 EDT        "

## 2023-11-02 LAB
BASOPHILS # BLD AUTO: 0.01 10*3/MM3 (ref 0–0.2)
BASOPHILS NFR BLD AUTO: 0.1 % (ref 0–1.5)
DEPRECATED RDW RBC AUTO: 44.1 FL (ref 37–54)
EOSINOPHIL # BLD AUTO: 0.07 10*3/MM3 (ref 0–0.4)
EOSINOPHIL NFR BLD AUTO: 0.8 % (ref 0.3–6.2)
ERYTHROCYTE [DISTWIDTH] IN BLOOD BY AUTOMATED COUNT: 13.4 % (ref 12.3–15.4)
HCT VFR BLD AUTO: 37.4 % (ref 34–46.6)
HGB BLD-MCNC: 12.5 G/DL (ref 12–15.9)
IMM GRANULOCYTES # BLD AUTO: 0.03 10*3/MM3 (ref 0–0.05)
IMM GRANULOCYTES NFR BLD AUTO: 0.3 % (ref 0–0.5)
LYMPHOCYTES # BLD AUTO: 2.53 10*3/MM3 (ref 0.7–3.1)
LYMPHOCYTES NFR BLD AUTO: 27.9 % (ref 19.6–45.3)
MCH RBC QN AUTO: 30 PG (ref 26.6–33)
MCHC RBC AUTO-ENTMCNC: 33.4 G/DL (ref 31.5–35.7)
MCV RBC AUTO: 89.7 FL (ref 79–97)
MONOCYTES # BLD AUTO: 0.42 10*3/MM3 (ref 0.1–0.9)
MONOCYTES NFR BLD AUTO: 4.6 % (ref 5–12)
NEUTROPHILS NFR BLD AUTO: 6 10*3/MM3 (ref 1.7–7)
NEUTROPHILS NFR BLD AUTO: 66.3 % (ref 42.7–76)
NRBC BLD AUTO-RTO: 0 /100 WBC (ref 0–0.2)
PLATELET # BLD AUTO: 213 10*3/MM3 (ref 140–450)
PMV BLD AUTO: 10.3 FL (ref 6–12)
RBC # BLD AUTO: 4.17 10*6/MM3 (ref 3.77–5.28)
WBC NRBC COR # BLD: 9.06 10*3/MM3 (ref 3.4–10.8)

## 2023-11-02 PROCEDURE — 85025 COMPLETE CBC W/AUTO DIFF WBC: CPT | Performed by: OBSTETRICS & GYNECOLOGY

## 2023-11-02 PROCEDURE — 25010000002 RHO D IMMUNE GLOBULIN 1500 UNIT/2ML SOLUTION PREFILLED SYRINGE: Performed by: OBSTETRICS & GYNECOLOGY

## 2023-11-02 RX ADMIN — HUMAN RHO(D) IMMUNE GLOBULIN 1500 UNITS: 1500 SOLUTION INTRAMUSCULAR; INTRAVENOUS at 11:59

## 2023-11-02 RX ADMIN — IBUPROFEN 600 MG: 600 TABLET, FILM COATED ORAL at 10:50

## 2023-11-02 RX ADMIN — IBUPROFEN 600 MG: 600 TABLET, FILM COATED ORAL at 04:51

## 2023-11-02 RX ADMIN — ACETAMINOPHEN 650 MG: 325 TABLET, FILM COATED ORAL at 18:44

## 2023-11-02 RX ADMIN — IBUPROFEN 600 MG: 600 TABLET, FILM COATED ORAL at 18:44

## 2023-11-02 RX ADMIN — DOCUSATE SODIUM 100 MG: 100 CAPSULE, LIQUID FILLED ORAL at 08:23

## 2023-11-02 RX ADMIN — Medication 1 TABLET: at 08:23

## 2023-11-02 NOTE — LACTATION NOTE
This note was copied from a baby's chart.  P2, T, [place] speaking.  ID [id number]. BF first child  for 6 months. Baby has not latched since birth. Reviewed feeding cues; frequency/duration; rousing sleepy baby; diaper expectations;stimulating breasts; HE; positioning at breast; signs of good latch;Lanolin use; charting feedings/ output on clipboard; common infant behavior including sleepiness; common to see only drops of colostrum and infant stomach size. Referred to [employer] Postpartum and  handbook pgs 35-45. Mother needs PBP, will fax. Baby showed some feeding cues when teaching suck training and was awake but after placed in football and cradle position she showed no interest after simulation and attempting for 25 minutes. Placed baby in STS on mom and asked her to watch for feeding cues. Family is in room and supportive.

## 2023-11-02 NOTE — ANESTHESIA POSTPROCEDURE EVALUATION
Patient: Jocelyn Lyon    Procedure Summary       Date: 11/01/23 Room / Location:     Anesthesia Start: 1317 Anesthesia Stop: 1636    Procedure: LABOR ANALGESIA Diagnosis:     Scheduled Providers:  Provider: Wilner Villalba MD    Anesthesia Type: epidural ASA Status: 2            Anesthesia Type: epidural    Vitals  Vitals Value Taken Time   /79 11/01/23 2050   Temp 36.7 °C (98.1 °F) 11/01/23 2050   Pulse 114 11/01/23 2050   Resp 18 11/01/23 2050   SpO2             Post Anesthesia Care and Evaluation    Anesthetic complications: No anesthetic complications

## 2023-11-02 NOTE — LACTATION NOTE
ID [id number]. Called to assist with waking and latching baby. Baby was still sound asleep. Mom had pumped some colostrum. Finger fed to baby and attempted to wake baby for 10 minutes. Placed NS baby still would not latch. Mom was getting tired and worried since baby has not eaten since birth and requested formula. She took 2 ml and spit it up. We reviewed how to feeding technique and burping. I was able to feed her 18 ml. Parents want to sleep and attempt bf next time. Encouraged bf without NS if baby shows hunger cues, then feed ebm and formula if they choose.

## 2023-11-02 NOTE — LACTATION NOTE
RN called for LC to assist patient. Night LC worked extensively with patient last night because baby was so sleepy and unresponsive to attempts to latch. LC came in and observed infant to be latched to left breast in cross cradle with good alignment of infant body ; ear over shoulder, shoulder aligned with hip and turned in completely toward mom . Baby had eyes open and had a nutritive suckle with sucking bursts and pauses . Mouth looked to be narrow but patient denied pain. Reviewed patient booklet with both parents. LC# on WB.  Lactation Consult Note    Evaluation Completed: 2023 09:20 EDT  Patient Name: Jocelyn Lyon  :  1990  MRN:  [id number]     REFERRAL  INFORMATION:                          Date of Referral: 23   Person Making Referral: nurse  Maternal Reason for Referral: latch not attained  Infant Reason for Referral: sleepy      MATERNAL ASSESSMENT:  Breast Size Issue: none (23)  Breast Shape: Bilateral:, pendulous (23)  Breast Density: Bilateral:, soft (23)  Areola: Bilateral:, elastic (23)  Nipples: Bilateral:, everted (23)                MATERNAL INFANT FEEDING:     Maternal Emotional State: receptive (23)  Infant Positioning: cross-cradle (23)   Signs of Milk Transfer: suck/swallow ratio, transfer present (23)  Pain with Feeding: no (23)           Milk Ejection Reflex: present (23)           Latch Assistance: none needed (23)                                                                                EQUIPMENT TYPE:  Breast Pump Type: manual pump (23)                              BREAST PUMPING:          LACTATION REFERRALS:

## 2023-11-02 NOTE — PROGRESS NOTES
Continued Stay Note  [facility]     Patient Name: Jocelyn Lyon  MRN: [id number]  Today's Date: 11/2/2023    Admit Date: 11/1/2023    Plan: Infant may discharge to mother when medically ready. CSW will follow cord tox. RUFINO Sultana   Discharge Plan       Row Name 11/02/23 1621       Plan    Plan Infant may discharge to mother when medically ready. CSW will follow cord tox. RUFINO Sultana    Plan Comments Mother: Jocelyn Lyon, MRN [id number]; infant: Loi Lyon, MRN [id number]. CSW not consulted, but chart review indicated infant's cord toxicology has been sent. Of note, no UDS was collected for mother prenatally or on admission; no UDS was collected for infant. CSW spoke with mother's RN Mari who was unsure why the cord toxicology was sent. RN voiced that mother and FOB have been appropriate and attentive to infant's needs, and had no concerns. CSW was unable to find criteria that indicated need for cord toxicology to be sent. CSW and RN determined it would not benefit mother to have CSW meet with her. CSW will remain available if any psychosocial needs arise during mother's admission. CSW will follow infant's cord toxicology and complete mandated reporting to CPS if warranted. RUFINO Sultana                   Discharge Codes    No documentation.                       JACEY Calabrese

## 2023-11-02 NOTE — PROGRESS NOTES
Postpartum Progress Note      Status post Vaginal Delivery: Doing well postoperatively.     1) postpartum care immediately following delivery :    Routine care, plan for discharge tomorrow  2) Borderine BP: asymptomatic, continue to monitor    Rh status: O Negative, baby Rh Pos - fetal screen negative - one vial of Rhogam  Rubella: Not immune, MMR ordered  Gender: Female    Subjective     Postpartum Day 1: Vaginal delivery    The patient feels well. The patient denies emotional concerns. Pain is well controlled with current medications. The baby is well. The patient is ambulating well. The patient is tolerating a normal diet.     Objective     Vital signs in last 24 hours:  Temp:  [97.7 °F (36.5 °C)-98.4 °F (36.9 °C)] 98.2 °F (36.8 °C)  Heart Rate:  [] 93  Resp:  [16-18] 18  BP: (103-141)/(59-90) 139/84      General:    alert, appears stated age, and cooperative   CV: Well perfused   Lungs: No respiratory distress   Abdomen:  Soft, Non-tender    Lochia:  appropriate   Uterine Fundus:   firm   Ext    Edema trace   DVT Evaluation:  No evidence of DVT seen on physical exam.     Lab Results   Component Value Date    WBC 9.06 11/02/2023    HGB 12.5 11/02/2023    HCT 37.4 11/02/2023    MCV 89.7 11/02/2023     11/02/2023       Bethany Montoya MD  11/2/2023  12:31 EDT

## 2023-11-02 NOTE — NURSING NOTE
Educated mother using ipad  about Rhogam due to lab coming to draw blood. Mother verbalized understanding but requested to wait for it to be given till MD educated her more

## 2023-11-02 NOTE — LACTATION NOTE
This note was copied from a baby's chart.   ID [id number]. Called into room to assist with latching. Baby was asleep in mom's arms. Changed her diaper and soiled u bag. Mom independently positioned herself and baby and does all the correct techniques to get baby to latch. Again baby was awake but disinterested in latching.she does lick but will not suck. Continued to educate on suck training and infant behavior. Encouraged mom to keep baby on skin and watch for feeding cues. She asked about HDM and explained how we determine if baby can have. Made a plan to attempt again in 1 hour if baby showed no interest and check glucose level.Mom would like to not use formula at this time but is concerned baby isn't eating. LC number is on board.

## 2023-11-02 NOTE — PLAN OF CARE
Goal Outcome Evaluation:  Plan of Care Reviewed With: patient, spouse        Progress: improving  Outcome Evaluation: 1PP. VSS. Pain well managed. voiding and ambulating well. bleeding wnl. bonding with infant

## 2023-11-03 VITALS
HEART RATE: 77 BPM | WEIGHT: 214 LBS | OXYGEN SATURATION: 99 % | BODY MASS INDEX: 34.39 KG/M2 | TEMPERATURE: 97.9 F | SYSTOLIC BLOOD PRESSURE: 126 MMHG | HEIGHT: 66 IN | DIASTOLIC BLOOD PRESSURE: 76 MMHG | RESPIRATION RATE: 17 BRPM

## 2023-11-03 RX ORDER — IBUPROFEN 600 MG/1
600 TABLET ORAL EVERY 6 HOURS PRN
Qty: 30 TABLET | Refills: 1 | Status: SHIPPED | OUTPATIENT
Start: 2023-11-03

## 2023-11-03 RX ORDER — HYDROCODONE BITARTRATE AND ACETAMINOPHEN 5; 325 MG/1; MG/1
1 TABLET ORAL EVERY 4 HOURS PRN
Qty: 12 TABLET | Refills: 0 | Status: SHIPPED | OUTPATIENT
Start: 2023-11-03 | End: 2023-11-08

## 2023-11-03 RX ADMIN — Medication 1 TABLET: at 08:14

## 2023-11-03 RX ADMIN — DOCUSATE SODIUM 100 MG: 100 CAPSULE, LIQUID FILLED ORAL at 08:14

## 2023-11-03 RX ADMIN — IBUPROFEN 600 MG: 600 TABLET, FILM COATED ORAL at 04:11

## 2023-11-03 NOTE — DISCHARGE SUMMARY
Date of Discharge:  11/3/2023    Discharge Diagnosis: Term vaginal delivery    Presenting Problem/History of Present Illness  Active Hospital Problems    Diagnosis  POA    **39 weeks gestation of pregnancy [Z3A.39]  Not Applicable      Resolved Hospital Problems   No resolved problems to display.            Hospital Course  Patient is a 32 y.o. female presented with 40-week 5-day induction of labor.  She underwent normal vaginal delivery.  Please see separate history and physical and delivery summary for details..  She did very well in the postpartum unit.  She did have some borderline elevated blood pressures 141/84 and 139/84 but 126/76 on the morning of discharge.  She is ambulating, voiding, tolerating regular diet and her postdelivery hemoglobin was stable at 12.5.  She stable for discharge home today.    Procedures Performed         Consults:   Consults       No orders found from 10/3/2023 to 11/2/2023.            Pertinent Test Results: labs: Postdelivery hemoglobin 12.5    Condition on Discharge: Stable and improving    Vital Signs  Temp:  [97.9 °F (36.6 °C)-98.5 °F (36.9 °C)] 97.9 °F (36.6 °C)  Heart Rate:  [77-96] 77  Resp:  [17-18] 17  BP: (108-138)/(69-84) 126/76    Physical Exam:   General Appearance: alert, appears stated age, and cooperative  Abdomen: normal bowel sounds, no masses, no hepatomegaly, no splenomegaly, soft non-tender, no guarding, and no rebound tenderness  Psych: normal    Discharge Disposition  Home or Self Care    Discharge Medications     Discharge Medications        New Medications        Instructions Start Date   HYDROcodone-acetaminophen 5-325 MG per tablet  Commonly known as: NORCO   1 tablet, Oral, Every 4 Hours PRN      ibuprofen 600 MG tablet  Commonly known as: ADVIL,MOTRIN   600 mg, Oral, Every 6 Hours PRN             Continue These Medications        Instructions Start Date   calcium carbonate 600 MG tablet  Commonly known as: OS-OPAL   600 mg, Oral, Daily       ondansetron 4 MG tablet  Commonly known as: Zofran   4 mg, Oral, Daily PRN      ondansetron 4 MG tablet  Commonly known as: Zofran   4 mg, Oral, Daily PRN      prenatal vitamin 27-0.8 27-0.8 MG tablet tablet   1 tablet, Oral, Daily               Discharge Diet: Regular    Activity at Discharge: Increasing as tolerated    Follow-up Appointments.  Follow-up in 1 week for blood pressure check  No future appointments.      Test Results Pending at Discharge       Thomas Conley MD  11/03/23  10:55 EDT    Time:

## 2023-11-03 NOTE — DISCHARGE INSTRUCTIONS
Routine vaginal delivery and hypertension instructions.  Nothing in the vagina for 6 weeks.  Follow-up appointment with Dr. Montoya within 1 week for blood pressure check.

## 2023-11-03 NOTE — PLAN OF CARE
Goal Outcome Evaluation:  Plan of Care Reviewed With: patient, spouse        Progress: improving  Outcome Evaluation: 2PP. VSS. Pain well managed. bleeding wnl. voiding and ambulating well.

## 2023-11-06 ENCOUNTER — TELEPHONE (OUTPATIENT)
Dept: OBSTETRICS AND GYNECOLOGY | Facility: CLINIC | Age: 33
End: 2023-11-06
Payer: COMMERCIAL

## 2023-11-06 RX ORDER — HYDROCORTISONE 25 MG/G
CREAM TOPICAL
Qty: 28 G | Refills: 0 | Status: SHIPPED | OUTPATIENT
Start: 2023-11-06 | End: 2023-11-08 | Stop reason: SDUPTHER

## 2023-11-06 NOTE — TELEPHONE ENCOUNTER
Caller: Jocelyn Horn     Relationship: SELF     Best call back number: [phone]    What is your medical concern? HEMMROIDS    How long has this issue been going on? SINCE DELIVERY OF BABY ON 11/1/23    Is your provider already aware of this issue? ??    Have you been treated for this issue? WANTS TO KNOW IF THERE IS ANYTHING THAT CAN BE CALLED IN FOR THIS ISSUE - PT IS HAVING TROUBLE SITTING BECAUSE OF THEM

## 2023-11-06 NOTE — PROGRESS NOTES
Continued Stay Note  [facility]     Patient Name: Jocelyn Lyon  MRN: [id number]  Today's Date: 11/6/2023    Admit Date: 11/1/2023    Plan: Infant may discharge to mother when medically ready. CSW will follow cord tox. RUFINO Sultana   Discharge Plan       Row Name 11/06/23 1527       Plan    Plan Comments Mother: Jocelyn Lyon, MRN [id number]; infant: Loi Lyon, MRN [id number]. CSW has reviewed infant's cord toxicology results and infant's cord was negative for substances. Mandated CPS reporting is not required at this time. RUFINO Chatman.                   Discharge Codes    No documentation.                 Expected Discharge Date and Time       Expected Discharge Date Expected Discharge Time    Nov 3, 2023               JACEY Carballo

## 2023-11-06 NOTE — TELEPHONE ENCOUNTER
I have sent anusol to her pharmacy.  If she is in so much pain that she can't sit, she may need to be seen in ED to make sure they are not thrombosed, as that would require surgical management.

## 2023-11-08 ENCOUNTER — POSTPARTUM VISIT (OUTPATIENT)
Dept: OBSTETRICS AND GYNECOLOGY | Facility: CLINIC | Age: 33
End: 2023-11-08
Payer: COMMERCIAL

## 2023-11-08 VITALS
HEART RATE: 90 BPM | WEIGHT: 196 LBS | DIASTOLIC BLOOD PRESSURE: 83 MMHG | HEIGHT: 66 IN | BODY MASS INDEX: 31.5 KG/M2 | SYSTOLIC BLOOD PRESSURE: 133 MMHG

## 2023-11-08 DIAGNOSIS — K64.4 EXTERNAL HEMORRHOIDS: ICD-10-CM

## 2023-11-08 DIAGNOSIS — R03.0 ELEVATED BLOOD PRESSURE READING: ICD-10-CM

## 2023-11-08 RX ORDER — HYDROCORTISONE 25 MG/G
CREAM TOPICAL
Qty: 28 G | Refills: 0 | Status: SHIPPED | OUTPATIENT
Start: 2023-11-08

## 2023-11-08 NOTE — PROGRESS NOTES
"Chief Complaint   Patient presents with    Postpartum Care     Bp check.         SUBJECTIVE:   Jocelyn Lyon is a 32 y.o.  who presents s/p  Spontaneous Vaginal Delivery on 23.  Reports that she had to strain to have a bowel movement and as result there was blood in the toilet and lots of pain from her hemorrhoids.  She had been using MiraLAX during pregnancy but stopped due to breast-feeding.  Reports going to her child's pediatrician and having some clogged ducts which is improved overall; she is combo feeding and worried that her daughter is also constipated.  She has been using Preparation H for her hemorrhoids.    OB History    Para Term  AB Living   3 2 2   0 2   SAB IAB Ectopic Molar Multiple Live Births     0     0 2      # Outcome Date GA Lbr Jose E/2nd Weight Sex Delivery Anes PTL Lv   3 Term 23 40w5d 01:53 / 01:41 3505 g (7 lb 11.6 oz) F Vag-Spont EPI N NAIN      Birth Comments: Panda RM 4   2 Term 21   3430 g (7 lb 9 oz) M Vag-Spont   NAIN   1                  History reviewed. No pertinent past medical history.  History reviewed. No pertinent surgical history.  Social History     Tobacco Use    Smoking status: Never    Smokeless tobacco: Never   Vaping Use    Vaping Use: Never used   Substance Use Topics    Alcohol use: Not Currently    Drug use: Never     Family History   Problem Relation Age of Onset    Breast cancer Neg Hx     Ovarian cancer Neg Hx     Uterine cancer Neg Hx     Colon cancer Neg Hx        Patient Active Problem List   Diagnosis    Rh negative, antepartum    39 weeks gestation of pregnancy        OBJECTIVE:   /83   Pulse 90   Ht 167.6 cm (65.98\")   Wt 88.9 kg (196 lb)   LMP 2023   Breastfeeding No   BMI 31.65 kg/m²    Physical Examination:   General appearance - alert, well appearing, and in no distress  Chest - no tachypnea, retractions or cyanosis  Heart - normal rate and regular rhythm  Abdomen - soft, nontender, " nondistended, no masses or organomegaly;   Pelvic-normal external genitalia, nonthrombosed hemorrhoid at 12:00 approximately 2 cm  Neurological - screening mental status exam normal  Musculoskeletal - no joint tenderness, deformity or swelling  Psychiatric - normal mood and affect    Lab Results   Component Value Date    WBC 9.06 11/02/2023    HGB 12.5 11/02/2023    HCT 37.4 11/02/2023    MCV 89.7 11/02/2023     11/02/2023        ASSESSMENT/PLAN:  (Z39.2) Postpartum care and examination    (R03.0) Elevated blood pressure reading    (K64.4) External hemorrhoids    Doing well postpartum  Baby girl doing well - recommend follow up with peds for constipaton  Recommend lactation consult for breastfeeding if patient is interested  Hemorrhoids: Recommend restarting MiraLAX discussed safety in breast-feeding.  Recommend trial of higher dose topical steroid and making some freezer pads to help with discomfort.  BP normal this check, asymptomatic  At this time, continue pelvic rest and lifting precautions.    Return in about 4 weeks (around 12/6/2023), or if symptoms worsen or fail to improve.

## 2023-11-08 NOTE — PATIENT INSTRUCTIONS
To set up an appointment with [facility], please call [phone]     Gerard ga intima congelada con ag intima y ponga encima agua or aloe vera. Puede usar hamamelis tambien. Coloque en el congelador por unas horas

## 2023-12-05 ENCOUNTER — POSTPARTUM VISIT (OUTPATIENT)
Dept: OBSTETRICS AND GYNECOLOGY | Facility: CLINIC | Age: 33
End: 2023-12-05
Payer: COMMERCIAL

## 2023-12-05 VITALS
DIASTOLIC BLOOD PRESSURE: 75 MMHG | BODY MASS INDEX: 30.7 KG/M2 | WEIGHT: 191 LBS | SYSTOLIC BLOOD PRESSURE: 111 MMHG | HEIGHT: 66 IN

## 2023-12-05 DIAGNOSIS — K64.4 EXTERNAL HEMORRHOIDS: ICD-10-CM

## 2023-12-05 NOTE — PROGRESS NOTES
"Chief Complaint   Patient presents with    Postpartum Care     5 wk pp.         SUBJECTIVE:   Jocelyn Lyon is a 33 y.o.  who presents s/p  Spontaneous Vaginal Delivery on 23.  Reports that she is still having hemorrhoid pain and some bleeding. She is taking a stool softener.  Bowel and bladder function have otherwise returned to normal  Mood changes none, EPDS 0  Breastfeeding  Bleeding is scant  She would like a tubal ligation for contraception, papers signed in 2023    OB History    Para Term  AB Living   3 2 2   0 2   SAB IAB Ectopic Molar Multiple Live Births     0     0 2      # Outcome Date GA Lbr Jose E/2nd Weight Sex Delivery Anes PTL Lv   3 Term 23 40w5d 01:53 / 01:41 3505 g (7 lb 11.6 oz) F Vag-Spont EPI N NAIN      Birth Comments: Pablo RM 4   2 Term 21   3430 g (7 lb 9 oz) M Vag-Spont   NAIN   1                    History reviewed. No pertinent past medical history.  History reviewed. No pertinent surgical history.  Social History     Tobacco Use    Smoking status: Never    Smokeless tobacco: Never   Vaping Use    Vaping Use: Never used   Substance Use Topics    Alcohol use: Not Currently    Drug use: Never     Family History   Problem Relation Age of Onset    Breast cancer Neg Hx     Ovarian cancer Neg Hx     Uterine cancer Neg Hx     Colon cancer Neg Hx        Patient Active Problem List   Diagnosis    Rh negative, antepartum    39 weeks gestation of pregnancy        OBJECTIVE:   /75   Ht 167.6 cm (65.98\")   Wt 86.6 kg (191 lb)   Breastfeeding Yes   BMI 30.84 kg/m²    Physical Examination:   General appearance - alert, well appearing, and in no distress  Breasts - normal, no masses bilaterally, no nipple retraction, no lactation  Chest - no tachypnea, retractions or cyanosis  Heart - normal rate and regular rhythm  Abdomen - soft, nontender, nondistended, no masses or organomegaly;   Pelvic - normal external genitalia, vulva, vagina, cervix, " uterus and adnexa, no bleeding  Neurological - screening mental status exam normal  Musculoskeletal - no joint tenderness, deformity or swelling  Psychiatric - normal mood and affect    Lab Results   Component Value Date    WBC 9.06 11/02/2023    HGB 12.5 11/02/2023    HCT 37.4 11/02/2023    MCV 89.7 11/02/2023     11/02/2023        ASSESSMENT:   (Z39.2) Postpartum care and examination - Plan: IGP, Apt HPV,rfx 16 / 18,45    (K64.4) External hemorrhoids - Plan: Ambulatory Referral to Colorectal Surgery      PLAN:   Doing well postpartum  Baby doing well  Contraception counseled on options, would like to do tubal.  Counseled on methods including salpingectomy, electrocautery/clpis. She would like to do salpingectomy. Reviewed risks/benefits, and LARC as alternative.  Will schedule. Papers signed in July and repeated today  At this time, may resume normal activity including intercourse and lifting.  Reviewed normal precautions.Declines interval contraception  Reviewed if she is still having some pelvic and back pain in next 2-3 months, would recommend PT and she will let us know if interested.   Pap updated today  Would like referral to colorectal for hemorrhoids.   Recommended follow up for annual exam or sooner with problems

## 2023-12-08 LAB
CYTOLOGIST CVX/VAG CYTO: NORMAL
CYTOLOGY CVX/VAG DOC CYTO: NORMAL
CYTOLOGY CVX/VAG DOC THIN PREP: NORMAL
DX ICD CODE: NORMAL
HIV 1 & 2 AB SER-IMP: NORMAL
HPV I/H RISK 4 DNA CVX QL PROBE+SIG AMP: NEGATIVE
Lab: NORMAL
OTHER STN SPEC: NORMAL
STAT OF ADQ CVX/VAG CYTO-IMP: NORMAL

## 2024-01-12 ENCOUNTER — TELEPHONE (OUTPATIENT)
Dept: OBSTETRICS AND GYNECOLOGY | Facility: CLINIC | Age: 34
End: 2024-01-12
Payer: COMMERCIAL

## 2024-01-12 DIAGNOSIS — Z30.2 ENCOUNTER FOR STERILIZATION: Primary | ICD-10-CM

## 2024-01-12 NOTE — TELEPHONE ENCOUNTER
Pt states she has been waiting on call to schedule for getting tubes tied, was not sure if case request was placed. Last seen 12/5/23    Please advise, thank you!

## 2024-01-23 PROBLEM — Z30.2 ENCOUNTER FOR STERILIZATION: Status: ACTIVE | Noted: 2023-12-05

## 2024-02-15 ENCOUNTER — OFFICE VISIT (OUTPATIENT)
Dept: SURGERY | Facility: CLINIC | Age: 34
End: 2024-02-15
Payer: COMMERCIAL

## 2024-02-15 VITALS
OXYGEN SATURATION: 97 % | HEART RATE: 86 BPM | SYSTOLIC BLOOD PRESSURE: 108 MMHG | DIASTOLIC BLOOD PRESSURE: 80 MMHG | BODY MASS INDEX: 30.86 KG/M2 | WEIGHT: 192 LBS | HEIGHT: 66 IN

## 2024-02-15 DIAGNOSIS — K64.4 EXTERNAL HEMORRHOIDS WITH COMPLICATION: Primary | ICD-10-CM

## 2024-02-15 DIAGNOSIS — K62.5 RECTAL BLEEDING: ICD-10-CM

## 2024-02-15 PROCEDURE — 99204 OFFICE O/P NEW MOD 45 MIN: CPT | Performed by: PHYSICIAN ASSISTANT

## 2024-02-15 PROCEDURE — 1159F MED LIST DOCD IN RCRD: CPT | Performed by: PHYSICIAN ASSISTANT

## 2024-02-15 PROCEDURE — 1160F RVW MEDS BY RX/DR IN RCRD: CPT | Performed by: PHYSICIAN ASSISTANT

## 2024-02-15 RX ORDER — HYDROCORTISONE 25 MG/G
CREAM TOPICAL
Qty: 30 G | Refills: 1 | Status: SHIPPED | OUTPATIENT
Start: 2024-02-15

## 2024-02-16 ENCOUNTER — OFFICE VISIT (OUTPATIENT)
Dept: ORTHOPEDIC SURGERY | Facility: CLINIC | Age: 34
End: 2024-02-16
Payer: COMMERCIAL

## 2024-02-16 VITALS — HEIGHT: 65 IN | TEMPERATURE: 98.2 F | BODY MASS INDEX: 32.72 KG/M2 | WEIGHT: 196.4 LBS

## 2024-02-16 DIAGNOSIS — M50.30 DDD (DEGENERATIVE DISC DISEASE), CERVICAL: Primary | ICD-10-CM

## 2024-02-16 DIAGNOSIS — R52 PAIN: ICD-10-CM

## 2024-02-16 DIAGNOSIS — M54.2 NECK PAIN: ICD-10-CM

## 2024-02-16 RX ORDER — IBUPROFEN 200 MG
200 TABLET ORAL EVERY 6 HOURS PRN
COMMUNITY

## 2024-02-16 RX ORDER — ACETAMINOPHEN 325 MG/1
650 TABLET ORAL EVERY 6 HOURS PRN
COMMUNITY

## 2024-02-26 ENCOUNTER — TELEPHONE (OUTPATIENT)
Dept: OBSTETRICS AND GYNECOLOGY | Facility: CLINIC | Age: 34
End: 2024-02-26
Payer: COMMERCIAL

## 2024-02-26 ENCOUNTER — TREATMENT (OUTPATIENT)
Dept: PHYSICAL THERAPY | Facility: CLINIC | Age: 34
End: 2024-02-26
Payer: COMMERCIAL

## 2024-02-26 DIAGNOSIS — M50.30 DDD (DEGENERATIVE DISC DISEASE), CERVICAL: ICD-10-CM

## 2024-02-26 DIAGNOSIS — M54.2 PAIN, NECK: Primary | ICD-10-CM

## 2024-02-26 DIAGNOSIS — M53.82 DECREASED RANGE OF MOTION OF INTERVERTEBRAL DISCS OF CERVICAL SPINE: ICD-10-CM

## 2024-02-26 DIAGNOSIS — R29.898 IMPAIRED FLEXIBILITY OF UPPER EXTREMITY: ICD-10-CM

## 2024-02-26 NOTE — PROGRESS NOTES
Physical Therapy Initial Evaluation and Plan of Care                                               [address], [address]                                                           [place], [place]                                                         [phone]    Patient: Jocelyn Lyon   : 1990  Diagnosis/ICD-10 Code:  Pain, neck [M54.2]  Referring practitioner: JOANNA Parham  Date of Initial Visit: 2024  Today's Date: 3/4/2024  Patient seen for 1 sessions           Subjective Questionnaire: NDI: not completed at this time      Subjective Evaluation    History of Present Illness  Mechanism of injury: Audio  used for duration of the evaluation.     Pt reports chronic neck pain for many years without known injury. She feels pain bilaterally through her cervical spine, radiating to her midline thoracic spine and on occasion into her BUEs. She notes occasional swelling in bilateral hands at random as well.    Tylenol or ibuprofen will alleviate her pain at times. She does not use heat or other modality. Her pain does dissipate when she is lying supine though she often wakes in the night or in the morning due to pain and discomfort in her neck    Functional limitations: driving, breast feeding for long time, standing             Patient Occupation: Not currently working Quality of life: good    Pain  Current pain ratin  At best pain ratin  At worst pain rating: 10  Location: Midline neck  Quality: sharp, burning and dull ache  Relieving factors: medications and rest  Aggravating factors: sleeping, standing, movement, lifting and prolonged positioning    Patient Goals  Patient goals for therapy: increased motion, return to sport/leisure activities, independence with ADLs/IADLs, decreased pain and decreased edema  Patient goal: return to exercise to lose weight           Objective          Palpation   Left   Tenderness of the cervical paraspinals, middle  trapezius, suboccipitals and upper trapezius.     Right Tenderness of the cervical paraspinals, middle trapezius, suboccipitals and upper trapezius.     Tenderness   Cervical Spine   Tenderness in the spinous process, left scapula and right scapula.     Left Shoulder   No tenderness in the clavicle.     Right Shoulder  No tenderness in the clavicle.     Neurological Testing     Sensation   Cervical/Thoracic   Left   Intact: light touch    Right   Intact: light touch    Active Range of Motion   Cervical/Thoracic Spine   Cervical    Flexion: WFL and with pain  Extension: Neck active extension: 25% with pain  Left lateral flexion: 35 degrees with pain  Right lateral flexion: 30 degrees with pain  Left rotation: Neck active rotation left: 75%   Right rotation: Neck active rotation right: 75% with pain  Left Shoulder   Normal active range of motion  Flexion: with pain    Right Shoulder   Normal active range of motion  Flexion: with pain    Additional Active Range of Motion Details  Normal with exception of bilateral flexion:  degrees with pain    Strength/Myotome Testing   Cervical Spine   Neck extension: 4  Neck flexion: 4    Additional Strength Details  Cervical lateral flexion:  R: 4/5  L: 4/5          Assessment & Plan       Assessment  Impairments: abnormal or restricted ROM, activity intolerance, impaired physical strength, lacks appropriate home exercise program, pain with function and weight-bearing intolerance   Functional limitations: lifting, sleeping, walking, uncomfortable because of pain, sitting and standing   Assessment details: iPad  used throughout duration of the evaluation. Jocelyn is a 34 y/o female reporting to OP PT regarding chronic neck and mid back pain which has been present for many years without known injury or trauma. Her bilateral neck pain is described as a constant ache which can become sharp or burning with prolonged positioning such as sitting or standing. Jocelyn also  notes occasional swelling in her hands which occurs at random times. Upon evaluation she is significantly TTP along her spinous and transverse processes in her cervical and upper thoracic spine and displayed muscle spasms through her paraspinals and trapezius muscles bilaterally. Her cervical and bilateral shoulder range of motion is limited and painful as well. Skilled OP PT is deemed reasonable and necessary in order to address these deficits, limitations, and impairments and assist with improving her ability to perform ADLs and recreational activities without pain.   Prognosis: good    Goals  Plan Goals: Short Term: 4 weeks  1. Pt will be independent with initial HEP  2. Pt will report 50% decrease in cervical pain  3. Pt will demonstrate improved cervical AROM to WFL in all planes of motion without reports of pain for indication of improved ability to perform all ADLs   4. Pt will report improved sleep throughout the night with less disturbance from her neck pain    Long Term: 8 weeks  1. Pt will be independent with progressed HEP  2. Pt will report 0-2/10 pain in her neck  3. Pt will demonstrate improved posture including neutral cervical and scapular position for approximately 75% of her treatment session for indication of improved postural awareness and decreased neck pain  4. Pt will demonstrate full bilateral shoulder range of motion without reports of cervical pain for indication of improved ability to perform all ADLs  5. Pt will report less than one occurrence of bilateral hand swelling   6. Pt will report improved ability to sit and stand for longer than 30 minutes for indicaiton of improved ability to perform all ADLs  7. Pt will report return to or ability to return to the gym or walk for exercise     Plan  Therapy options: will be seen for skilled therapy services  Planned modality interventions: electrical stimulation/Russian stimulation and thermotherapy (hydrocollator packs)  Planned therapy  interventions: functional ROM exercises, home exercise program, therapeutic activities, stretching, strengthening, manual therapy, neuromuscular re-education and soft tissue mobilization  Frequency: 2x week  Duration in weeks: 8  Treatment plan discussed with: patient        Manual Therapy:    0     mins  20129;  Therapeutic Exercise:    10     mins  63858;     Neuromuscular Ese:    0    mins  85049;    Therapeutic Activity:     10     mins  16291;     Gait Trainin     mins  85620;     Ultrasound:     0     mins  04468;    Electrical Stimulation:    0     mins  80739 ( );  Dry Needling     0     mins self-pay    Timed Treatment:   20   mins   Total Treatment:     45   mins    PT SIGNATURE: Derick Jin PT, DPT  [place] License #: [id number]   Derick Jin PT, 24, 1:01 PM EST  DATE TREATMENT INITIATED: 3/4/2024    Initial Certification  Certification Period: 2024  I certify that the therapy services are furnished while this patient is under my care.  The services outlined above are required by this patient, and will be reviewed every 90 days.     PHYSICIAN: Amelia Florez, APRN      DATE:     Please sign and return via fax to [phone].. Thank you, [facility].

## 2024-02-28 ENCOUNTER — TREATMENT (OUTPATIENT)
Dept: PHYSICAL THERAPY | Facility: CLINIC | Age: 34
End: 2024-02-28
Payer: COMMERCIAL

## 2024-02-28 DIAGNOSIS — M54.2 PAIN, NECK: Primary | ICD-10-CM

## 2024-02-28 DIAGNOSIS — M53.82 DECREASED RANGE OF MOTION OF INTERVERTEBRAL DISCS OF CERVICAL SPINE: ICD-10-CM

## 2024-02-28 DIAGNOSIS — M50.30 DDD (DEGENERATIVE DISC DISEASE), CERVICAL: ICD-10-CM

## 2024-02-28 DIAGNOSIS — R29.898 IMPAIRED FLEXIBILITY OF UPPER EXTREMITY: ICD-10-CM

## 2024-02-28 NOTE — PROGRESS NOTES
Physical Therapy Daily Progress Note                                            [address], [address]                                                           [place], [place]                                                         [phone]    Patient: Jocelyn Lyon   : 1990  Diagnosis/ICD-10 Code:  Pain, neck [M54.2]  Referring practitioner: JOANNA Parham  Date of Initial Visit: Type: THERAPY  Noted: 2024  Today's Date: 2024  Patient seen for 2 sessions           Subjective Evaluation    History of Present Illness    Subjective comment: Jocelyn reports being in a lot of pain today. She states that her neck pain feels slightly better after performing her HEP though it is painful while she does it. Bilateral swollen hands today.       Objective     See Exercise, Manual, and Modality Logs for complete treatment.     Assessment & Plan       Assessment  Assessment details: iPad  used for duration of session. Jocelyn had reports of pain with all cervical and scapulothoracic exercises this date. She demonstrated good form on exercises given at initial evaluation indicating good compliance. Open book and pect stretching were added this date to improve thoracic range of motion and overall posture. Bilateral shoulder ER strengthening exercise added to further improve posture. Plan to attempt STM to upper trap and suboccipital muscles next session.         Progress per Plan of Care           Manual Therapy:    0     mins  09638;  Therapeutic Exercise:    10     mins  66458;     Neuromuscular Ese:    28    mins  53195;    Therapeutic Activity:     0     mins  04163;     Gait Trainin     mins  66417;     Ultrasound:     0     mins  83190;    Electrical Stimulation:    0     mins  16301 ( );  Dry Needling     0     mins self-pay    Timed Treatment:   38   mins   Total Treatment:     48   mins    Derick Jin, PT, DPT  Physical Therapist  [place] License #:  [id number]  Derick Jin, PT, 02/28/24, 2:24 PM EST

## 2024-03-04 ENCOUNTER — TREATMENT (OUTPATIENT)
Dept: PHYSICAL THERAPY | Facility: CLINIC | Age: 34
End: 2024-03-04
Payer: COMMERCIAL

## 2024-03-04 DIAGNOSIS — M54.2 PAIN, NECK: Primary | ICD-10-CM

## 2024-03-04 DIAGNOSIS — M50.30 DDD (DEGENERATIVE DISC DISEASE), CERVICAL: ICD-10-CM

## 2024-03-04 DIAGNOSIS — R29.898 IMPAIRED FLEXIBILITY OF UPPER EXTREMITY: ICD-10-CM

## 2024-03-04 DIAGNOSIS — M53.82 DECREASED RANGE OF MOTION OF INTERVERTEBRAL DISCS OF CERVICAL SPINE: ICD-10-CM

## 2024-03-04 NOTE — PROGRESS NOTES
Physical Therapy Daily Treatment Note               [address]                                                                                                                                             [place] [address]    Patient: Jocelyn Lyon   : 1990  Referring practitioner: JOANNA Parham  Date of Initial Visit: Type: THERAPY  Noted: 2024  Today's Date: 3/4/2024  Patient seen for 3 sessions       Visit Diagnoses:    ICD-10-CM ICD-9-CM   1. Pain, neck  M54.2 723.1   2. DDD (degenerative disc disease), cervical  M50.30 722.4   3. Decreased range of motion of intervertebral discs of cervical spine  M53.82 724.9   4. Impaired flexibility of upper extremity  R29.898 781.99       Subjective Evaluation    History of Present Illness    Subjective comment: Pt reports (through [employer] line ) that her neck and (R) UT are very sore today.  The swelling in her hands has improved.       Objective   See Exercise, Manual, and Modality Logs for complete treatment.       Assessment & Plan       Assessment  Assessment details: Pt completed treatment with no verbal c/o increased pain.  She tolerated progressions in reps on several exercises with no issues.  Pt responds well to moist heat for her neck s/p treatment.  Plan to progress per POC.          Timed:         Manual Therapy:    0     mins  10534;     Therapeutic Exercise:    10     mins  25328;     Neuromuscular Ese:    30    mins  62493;    Therapeutic Activity:     0     mins  35992;     Gait Trainin     mins  40865;     Ultrasound:     0     mins  15896;    E Stim                            0    mins   71186( [id number])  Work Polo/Cond      0    mins   22239        Timed Treatment:   40   mins   Total Treatment:     40   mins    Keshav Stroud PTA  [place] License: [id number]

## 2024-03-05 ENCOUNTER — PRE-ADMISSION TESTING (OUTPATIENT)
Dept: PREADMISSION TESTING | Facility: HOSPITAL | Age: 34
End: 2024-03-05
Payer: COMMERCIAL

## 2024-03-05 VITALS
BODY MASS INDEX: 31.99 KG/M2 | HEIGHT: 65 IN | DIASTOLIC BLOOD PRESSURE: 76 MMHG | RESPIRATION RATE: 18 BRPM | HEART RATE: 81 BPM | OXYGEN SATURATION: 98 % | TEMPERATURE: 98.6 F | SYSTOLIC BLOOD PRESSURE: 112 MMHG | WEIGHT: 192 LBS

## 2024-03-05 LAB — HCG SERPL QL: NEGATIVE

## 2024-03-05 PROCEDURE — 36415 COLL VENOUS BLD VENIPUNCTURE: CPT

## 2024-03-05 PROCEDURE — 85025 COMPLETE CBC W/AUTO DIFF WBC: CPT | Performed by: OBSTETRICS & GYNECOLOGY

## 2024-03-05 PROCEDURE — 84703 CHORIONIC GONADOTROPIN ASSAY: CPT

## 2024-03-05 RX ORDER — METHOCARBAMOL 500 MG/1
500 TABLET, FILM COATED ORAL AS NEEDED
COMMUNITY

## 2024-03-05 RX ORDER — IBUPROFEN 600 MG/1
600 TABLET ORAL EVERY 6 HOURS PRN
Status: ON HOLD | COMMUNITY
Start: 2024-02-16 | End: 2024-03-07

## 2024-03-05 NOTE — DISCHARGE INSTRUCTIONS
ARRIVE DAY OF SURGERY AT  6:00 AM        Take the following medications the morning of surgery: NONE        If you are on prescription narcotic pain medication to control your pain you may also take that medication the morning of surgery.    General Instructions:  Do not eat solid food after midnight the night before surgery.  You may drink clear liquids day of surgery but must stop at least one hour before your hospital arrival time.  It is beneficial for you to have a clear drink that contains carbohydrates the day of surgery.  We suggest a 12 to 20 ounce bottle of Gatorade or Powerade for non-diabetic patients or a 12 to 20 ounce bottle of G2 or Powerade Zero for diabetic patients. (Pediatric patients, are not advised to drink a 12 to 20 ounce carbohydrate drink)    Clear liquids are liquids you can see through.  Nothing red in color.     Plain water                               Sports drinks  Sodas                                   Gelatin (Jell-O)  Fruit juices without pulp such as white grape juice and apple juice  Popsicles that contain no fruit or yogurt  Tea or coffee (no cream or milk added)  Gatorade / Powerade  G2 / Powerade Zero    Infants may have breast milk up to four hours before surgery.  Infants drinking formula may drink formula up to six hours before surgery.   Patients who avoid smoking, chewing tobacco and alcohol for 4 weeks prior to surgery have a reduced risk of post-operative complications.  Quit smoking as many days before surgery as you can.  Do not smoke, use chewing tobacco or drink alcohol the day of surgery.   If applicable bring your C-PAP/ BI-PAP machine in with you to preop day of surgery.  Bring any papers given to you in the doctor’s office.  Wear clean comfortable clothes.  Do not wear contact lenses, false eyelashes or make-up.  Bring a case for your glasses.   Bring crutches or walker if applicable.  Remove all piercings.  Leave jewelry and any other valuables at home.  Hair  extensions with metal clips must be removed prior to surgery.  The Pre-Admission Testing nurse will instruct you to bring medications if unable to obtain an accurate list in Pre-Admission Testing.      Preventing a Surgical Site Infection:  For 2 to 3 days before surgery, avoid shaving with a razor because the razor can irritate skin and make it easier to develop an infection.    Any areas of open skin can increase the risk of a post-operative wound infection by allowing bacteria to enter and travel throughout the body.  Notify your surgeon if you have any skin wounds / rashes even if it is not near the expected surgical site.  The area will need assessed to determine if surgery should be delayed until it is healed.  The night prior to surgery shower using a fresh bar of anti-bacterial soap (such as Dial) and clean washcloth.  Sleep in a clean bed with clean clothing.  Do not allow pets to sleep with you.  Shower on the morning of surgery using a fresh bar of anti-bacterial soap (such as Dial) and clean washcloth.  Dry with a clean towel and dress in clean clothing.  Ask your surgeon if you will be receiving antibiotics prior to surgery.  Make sure you, your family, and all healthcare providers clean their hands with soap and water or an alcohol based hand  before caring for you or your wound.    Day of surgery:  Your arrival time is approximately two hours before your scheduled surgery time.  Upon arrival, a Pre-op nurse and Anesthesiologist will review your health history, obtain vital signs, and answer questions you may have.  The only belongings needed at this time will be a list of your home medications and if applicable your C-PAP/BI-PAP machine.  A Pre-op nurse will start an IV and you may receive medication in preparation for surgery, including something to help you relax.     Please be aware that surgery does come with discomfort.  We want to make every effort to control your discomfort so please  discuss any uncontrolled symptoms with your nurse.   Your doctor will most likely have prescribed pain medications.      If you are going home after surgery you will receive individualized written care instructions before being discharged.  A responsible adult must drive you to and from the hospital on the day of your surgery and ideally stay with you through the night.   .  Discharge prescriptions can be filled by the hospital pharmacy during regular pharmacy hours.  If you are having surgery late in the day/evening your prescription may be e-prescribed to your pharmacy.  Please verify your pharmacy hours or chose a 24 hour pharmacy to avoid not having access to your prescription because your pharmacy has closed for the day.    If you are staying overnight following surgery, you will be transported to your hospital room following the recovery period.  [facility] has all private rooms.    If you have any questions please call Pre-Admission Testing at [phone].  Deductibles and co-payments are collected on the day of service. Please be prepared to pay the required co-pay, deductible or deposit on the day of service as defined by your plan.    Call your surgeon immediately if you experience any of the following symptoms:  Sore Throat  Shortness of Breath or difficulty breathing  Cough  Chills  Body soreness or muscle pain  Headache  Fever  New loss of taste or smell  Do not arrive for your surgery ill.  Your procedure will need to be r  escheduled to another time.  You will need to call your physician before the day of surgery to avoid any unnecessary exposure to hospital staff as well as other patients.  CHLORHEXIDINE CLOTH INSTRUCTIONS  The morning of surgery follow these instructions using the Chlorhexidine cloths you've been given.  These steps reduce bacteria on the body.  Do not use the cloths near your eyes, ears mouth, genitalia or on open wounds.  Throw the cloths away after use but do not  try to flush them down a toilet.      Open and remove one cloth at a time from the package.    Leave the cloth unfolded and begin the bathing.  Massage the skin with the cloths using gentle pressure to remove bacteria.  Do not scrub harshly.   Follow the steps below with one 2% CHG cloth per area (6 total cloths).  One cloth for neck, shoulders and chest.  One cloth for both arms, hands, fingers and underarms (do underarms last).  One cloth for the abdomen followed by groin.  One cloth for right leg and foot including between the toes.  One cloth for left leg and foot including between the toes.  The last cloth is to be used for the back of the neck, back and buttocks.    Allow the CHG to air dry 3 minutes on the skin which will give it time to work and decrease the chance of irritation.  The skin may feel sticky until it is dry.  Do not rinse with water or any other liquid or you will lose the beneficial effects of the CHG.  If mild skin irritation occurs, do rinse the skin to remove the CHG.  Report this to the nurse at time of admission.  Do not apply lotions, creams, ointments, deodorants or perfumes after using the clothes. Dress in clean clothes before coming to the hospital.

## 2024-03-07 ENCOUNTER — ANESTHESIA EVENT (OUTPATIENT)
Dept: PERIOP | Facility: HOSPITAL | Age: 34
End: 2024-03-07
Payer: COMMERCIAL

## 2024-03-07 ENCOUNTER — HOSPITAL ENCOUNTER (OUTPATIENT)
Facility: HOSPITAL | Age: 34
Setting detail: HOSPITAL OUTPATIENT SURGERY
Discharge: HOME | End: 2024-03-07
Attending: OBSTETRICS & GYNECOLOGY | Admitting: OBSTETRICS & GYNECOLOGY
Payer: COMMERCIAL

## 2024-03-07 ENCOUNTER — ANESTHESIA (OUTPATIENT)
Dept: PERIOP | Facility: HOSPITAL | Age: 34
End: 2024-03-07
Payer: COMMERCIAL

## 2024-03-07 VITALS
DIASTOLIC BLOOD PRESSURE: 76 MMHG | HEIGHT: 66 IN | HEART RATE: 85 BPM | OXYGEN SATURATION: 96 % | SYSTOLIC BLOOD PRESSURE: 105 MMHG | BODY MASS INDEX: 30.99 KG/M2 | TEMPERATURE: 97.8 F | RESPIRATION RATE: 16 BRPM

## 2024-03-07 DIAGNOSIS — Z30.2 ENCOUNTER FOR STERILIZATION: ICD-10-CM

## 2024-03-07 PROCEDURE — 25010000002 PROPOFOL 200 MG/20ML EMULSION: Performed by: NURSE ANESTHETIST, CERTIFIED REGISTERED

## 2024-03-07 PROCEDURE — 25010000002 KETOROLAC TROMETHAMINE PER 15 MG: Performed by: NURSE ANESTHETIST, CERTIFIED REGISTERED

## 2024-03-07 PROCEDURE — 25810000003 LACTATED RINGERS PER 1000 ML: Performed by: ANESTHESIOLOGY

## 2024-03-07 PROCEDURE — 58661 LAPAROSCOPY REMOVE ADNEXA: CPT | Performed by: OBSTETRICS & GYNECOLOGY

## 2024-03-07 PROCEDURE — 25010000002 SUGAMMADEX 200 MG/2ML SOLUTION: Performed by: NURSE ANESTHETIST, CERTIFIED REGISTERED

## 2024-03-07 PROCEDURE — 25010000002 FENTANYL CITRATE (PF) 50 MCG/ML SOLUTION: Performed by: NURSE ANESTHETIST, CERTIFIED REGISTERED

## 2024-03-07 PROCEDURE — 88302 TISSUE EXAM BY PATHOLOGIST: CPT | Performed by: OBSTETRICS & GYNECOLOGY

## 2024-03-07 PROCEDURE — 25010000002 ONDANSETRON PER 1 MG: Performed by: NURSE ANESTHETIST, CERTIFIED REGISTERED

## 2024-03-07 PROCEDURE — 25010000002 DEXAMETHASONE SODIUM PHOSPHATE 20 MG/5ML SOLUTION: Performed by: NURSE ANESTHETIST, CERTIFIED REGISTERED

## 2024-03-07 PROCEDURE — 25010000002 BUPIVACAINE (PF) 0.5 % SOLUTION: Performed by: OBSTETRICS & GYNECOLOGY

## 2024-03-07 RX ORDER — PROPOFOL 10 MG/ML
INJECTION, EMULSION INTRAVENOUS AS NEEDED
Status: DISCONTINUED | OUTPATIENT
Start: 2024-03-07 | End: 2024-03-07 | Stop reason: SURG

## 2024-03-07 RX ORDER — FENTANYL CITRATE 50 UG/ML
50 INJECTION, SOLUTION INTRAMUSCULAR; INTRAVENOUS
Status: DISCONTINUED | OUTPATIENT
Start: 2024-03-07 | End: 2024-03-07 | Stop reason: HOSPADM

## 2024-03-07 RX ORDER — PROMETHAZINE HYDROCHLORIDE 25 MG/1
25 TABLET ORAL ONCE AS NEEDED
Status: DISCONTINUED | OUTPATIENT
Start: 2024-03-07 | End: 2024-03-07 | Stop reason: HOSPADM

## 2024-03-07 RX ORDER — MIDAZOLAM HYDROCHLORIDE 1 MG/ML
1 INJECTION INTRAMUSCULAR; INTRAVENOUS
Status: DISCONTINUED | OUTPATIENT
Start: 2024-03-07 | End: 2024-03-07 | Stop reason: HOSPADM

## 2024-03-07 RX ORDER — SODIUM CHLORIDE 0.9 % (FLUSH) 0.9 %
3-10 SYRINGE (ML) INJECTION AS NEEDED
Status: DISCONTINUED | OUTPATIENT
Start: 2024-03-07 | End: 2024-03-07 | Stop reason: HOSPADM

## 2024-03-07 RX ORDER — ONDANSETRON 2 MG/ML
4 INJECTION INTRAMUSCULAR; INTRAVENOUS ONCE AS NEEDED
Status: DISCONTINUED | OUTPATIENT
Start: 2024-03-07 | End: 2024-03-07 | Stop reason: HOSPADM

## 2024-03-07 RX ORDER — BUPIVACAINE HYDROCHLORIDE 5 MG/ML
INJECTION, SOLUTION EPIDURAL; INTRACAUDAL AS NEEDED
Status: DISCONTINUED | OUTPATIENT
Start: 2024-03-07 | End: 2024-03-07 | Stop reason: HOSPADM

## 2024-03-07 RX ORDER — DIPHENHYDRAMINE HYDROCHLORIDE 50 MG/ML
12.5 INJECTION INTRAMUSCULAR; INTRAVENOUS
Status: DISCONTINUED | OUTPATIENT
Start: 2024-03-07 | End: 2024-03-07 | Stop reason: HOSPADM

## 2024-03-07 RX ORDER — HYDROCODONE BITARTRATE AND ACETAMINOPHEN 5; 325 MG/1; MG/1
1 TABLET ORAL ONCE AS NEEDED
Status: COMPLETED | OUTPATIENT
Start: 2024-03-07 | End: 2024-03-07

## 2024-03-07 RX ORDER — SODIUM CHLORIDE 0.9 % (FLUSH) 0.9 %
3 SYRINGE (ML) INJECTION EVERY 12 HOURS SCHEDULED
Status: DISCONTINUED | OUTPATIENT
Start: 2024-03-07 | End: 2024-03-07 | Stop reason: HOSPADM

## 2024-03-07 RX ORDER — DROPERIDOL 2.5 MG/ML
0.62 INJECTION, SOLUTION INTRAMUSCULAR; INTRAVENOUS
Status: DISCONTINUED | OUTPATIENT
Start: 2024-03-07 | End: 2024-03-07 | Stop reason: HOSPADM

## 2024-03-07 RX ORDER — OXYCODONE AND ACETAMINOPHEN 7.5; 325 MG/1; MG/1
1 TABLET ORAL EVERY 4 HOURS PRN
Status: DISCONTINUED | OUTPATIENT
Start: 2024-03-07 | End: 2024-03-07 | Stop reason: HOSPADM

## 2024-03-07 RX ORDER — FENTANYL CITRATE 50 UG/ML
50 INJECTION, SOLUTION INTRAMUSCULAR; INTRAVENOUS ONCE AS NEEDED
Status: DISCONTINUED | OUTPATIENT
Start: 2024-03-07 | End: 2024-03-07 | Stop reason: HOSPADM

## 2024-03-07 RX ORDER — MAGNESIUM HYDROXIDE 1200 MG/15ML
LIQUID ORAL AS NEEDED
Status: DISCONTINUED | OUTPATIENT
Start: 2024-03-07 | End: 2024-03-07 | Stop reason: HOSPADM

## 2024-03-07 RX ORDER — SODIUM CHLORIDE 0.9 % (FLUSH) 0.9 %
10 SYRINGE (ML) INJECTION AS NEEDED
Status: DISCONTINUED | OUTPATIENT
Start: 2024-03-07 | End: 2024-03-07 | Stop reason: HOSPADM

## 2024-03-07 RX ORDER — ACETAMINOPHEN 325 MG/1
650 TABLET ORAL EVERY 6 HOURS PRN
Qty: 30 TABLET | Refills: 0 | Status: SHIPPED | OUTPATIENT
Start: 2024-03-07

## 2024-03-07 RX ORDER — HYDROMORPHONE HYDROCHLORIDE 1 MG/ML
0.5 INJECTION, SOLUTION INTRAMUSCULAR; INTRAVENOUS; SUBCUTANEOUS
Status: DISCONTINUED | OUTPATIENT
Start: 2024-03-07 | End: 2024-03-07 | Stop reason: HOSPADM

## 2024-03-07 RX ORDER — HYDRALAZINE HYDROCHLORIDE 20 MG/ML
5 INJECTION INTRAMUSCULAR; INTRAVENOUS
Status: DISCONTINUED | OUTPATIENT
Start: 2024-03-07 | End: 2024-03-07 | Stop reason: HOSPADM

## 2024-03-07 RX ORDER — KETOROLAC TROMETHAMINE 30 MG/ML
INJECTION, SOLUTION INTRAMUSCULAR; INTRAVENOUS AS NEEDED
Status: DISCONTINUED | OUTPATIENT
Start: 2024-03-07 | End: 2024-03-07 | Stop reason: SURG

## 2024-03-07 RX ORDER — ONDANSETRON 2 MG/ML
INJECTION INTRAMUSCULAR; INTRAVENOUS AS NEEDED
Status: DISCONTINUED | OUTPATIENT
Start: 2024-03-07 | End: 2024-03-07 | Stop reason: SURG

## 2024-03-07 RX ORDER — POLYETHYLENE GLYCOL 3350 17 G/17G
17 POWDER, FOR SOLUTION ORAL DAILY
COMMUNITY

## 2024-03-07 RX ORDER — SODIUM CHLORIDE 9 MG/ML
40 INJECTION, SOLUTION INTRAVENOUS AS NEEDED
Status: DISCONTINUED | OUTPATIENT
Start: 2024-03-07 | End: 2024-03-07 | Stop reason: HOSPADM

## 2024-03-07 RX ORDER — FENTANYL CITRATE 50 UG/ML
INJECTION, SOLUTION INTRAMUSCULAR; INTRAVENOUS AS NEEDED
Status: DISCONTINUED | OUTPATIENT
Start: 2024-03-07 | End: 2024-03-07 | Stop reason: SURG

## 2024-03-07 RX ORDER — FAMOTIDINE 10 MG/ML
20 INJECTION, SOLUTION INTRAVENOUS ONCE
Status: COMPLETED | OUTPATIENT
Start: 2024-03-07 | End: 2024-03-07

## 2024-03-07 RX ORDER — LIDOCAINE HYDROCHLORIDE 10 MG/ML
0.5 INJECTION, SOLUTION INFILTRATION; PERINEURAL ONCE AS NEEDED
Status: DISCONTINUED | OUTPATIENT
Start: 2024-03-07 | End: 2024-03-07 | Stop reason: HOSPADM

## 2024-03-07 RX ORDER — FLUMAZENIL 0.1 MG/ML
0.2 INJECTION INTRAVENOUS AS NEEDED
Status: DISCONTINUED | OUTPATIENT
Start: 2024-03-07 | End: 2024-03-07 | Stop reason: HOSPADM

## 2024-03-07 RX ORDER — NALOXONE HCL 0.4 MG/ML
0.2 VIAL (ML) INJECTION AS NEEDED
Status: DISCONTINUED | OUTPATIENT
Start: 2024-03-07 | End: 2024-03-07 | Stop reason: HOSPADM

## 2024-03-07 RX ORDER — PROMETHAZINE HYDROCHLORIDE 25 MG/1
25 SUPPOSITORY RECTAL ONCE AS NEEDED
Status: DISCONTINUED | OUTPATIENT
Start: 2024-03-07 | End: 2024-03-07 | Stop reason: HOSPADM

## 2024-03-07 RX ORDER — DEXAMETHASONE SODIUM PHOSPHATE 4 MG/ML
INJECTION, SOLUTION INTRA-ARTICULAR; INTRALESIONAL; INTRAMUSCULAR; INTRAVENOUS; SOFT TISSUE AS NEEDED
Status: DISCONTINUED | OUTPATIENT
Start: 2024-03-07 | End: 2024-03-07 | Stop reason: SURG

## 2024-03-07 RX ORDER — IBUPROFEN 400 MG/1
400 TABLET ORAL EVERY 6 HOURS PRN
Qty: 30 TABLET | Refills: 0 | Status: SHIPPED | OUTPATIENT
Start: 2024-03-07

## 2024-03-07 RX ORDER — LABETALOL HYDROCHLORIDE 5 MG/ML
5 INJECTION, SOLUTION INTRAVENOUS
Status: DISCONTINUED | OUTPATIENT
Start: 2024-03-07 | End: 2024-03-07 | Stop reason: HOSPADM

## 2024-03-07 RX ORDER — LIDOCAINE HYDROCHLORIDE 20 MG/ML
INJECTION, SOLUTION INFILTRATION; PERINEURAL AS NEEDED
Status: DISCONTINUED | OUTPATIENT
Start: 2024-03-07 | End: 2024-03-07 | Stop reason: SURG

## 2024-03-07 RX ORDER — EPHEDRINE SULFATE 50 MG/ML
5 INJECTION, SOLUTION INTRAVENOUS ONCE AS NEEDED
Status: DISCONTINUED | OUTPATIENT
Start: 2024-03-07 | End: 2024-03-07 | Stop reason: HOSPADM

## 2024-03-07 RX ORDER — SODIUM CHLORIDE, SODIUM LACTATE, POTASSIUM CHLORIDE, CALCIUM CHLORIDE 600; 310; 30; 20 MG/100ML; MG/100ML; MG/100ML; MG/100ML
9 INJECTION, SOLUTION INTRAVENOUS CONTINUOUS
Status: DISCONTINUED | OUTPATIENT
Start: 2024-03-07 | End: 2024-03-07 | Stop reason: HOSPADM

## 2024-03-07 RX ORDER — ROCURONIUM BROMIDE 10 MG/ML
INJECTION, SOLUTION INTRAVENOUS AS NEEDED
Status: DISCONTINUED | OUTPATIENT
Start: 2024-03-07 | End: 2024-03-07 | Stop reason: SURG

## 2024-03-07 RX ORDER — TRAMADOL HYDROCHLORIDE 50 MG/1
50 TABLET ORAL EVERY 6 HOURS PRN
Qty: 5 TABLET | Refills: 0 | Status: SHIPPED | OUTPATIENT
Start: 2024-03-07

## 2024-03-07 RX ORDER — IPRATROPIUM BROMIDE AND ALBUTEROL SULFATE 2.5; .5 MG/3ML; MG/3ML
3 SOLUTION RESPIRATORY (INHALATION) ONCE AS NEEDED
Status: DISCONTINUED | OUTPATIENT
Start: 2024-03-07 | End: 2024-03-07 | Stop reason: HOSPADM

## 2024-03-07 RX ADMIN — FENTANYL CITRATE 25 MCG: 50 INJECTION, SOLUTION INTRAMUSCULAR; INTRAVENOUS at 08:14

## 2024-03-07 RX ADMIN — PROPOFOL 150 MG: 10 INJECTION, EMULSION INTRAVENOUS at 08:14

## 2024-03-07 RX ADMIN — FENTANYL CITRATE 25 MCG: 50 INJECTION, SOLUTION INTRAMUSCULAR; INTRAVENOUS at 08:35

## 2024-03-07 RX ADMIN — ONDANSETRON 4 MG: 2 INJECTION INTRAMUSCULAR; INTRAVENOUS at 09:01

## 2024-03-07 RX ADMIN — KETOROLAC TROMETHAMINE 30 MG: 30 INJECTION, SOLUTION INTRAMUSCULAR; INTRAVENOUS at 09:01

## 2024-03-07 RX ADMIN — FAMOTIDINE 20 MG: 10 INJECTION INTRAVENOUS at 06:53

## 2024-03-07 RX ADMIN — SUGAMMADEX 200 MG: 100 INJECTION, SOLUTION INTRAVENOUS at 09:08

## 2024-03-07 RX ADMIN — ROCURONIUM BROMIDE 50 MG: 10 INJECTION, SOLUTION INTRAVENOUS at 08:14

## 2024-03-07 RX ADMIN — SODIUM CHLORIDE, POTASSIUM CHLORIDE, SODIUM LACTATE AND CALCIUM CHLORIDE 9 ML/HR: 600; 310; 30; 20 INJECTION, SOLUTION INTRAVENOUS at 06:39

## 2024-03-07 RX ADMIN — LIDOCAINE HYDROCHLORIDE 100 MG: 20 INJECTION, SOLUTION INFILTRATION; PERINEURAL at 08:14

## 2024-03-07 RX ADMIN — DEXAMETHASONE SODIUM PHOSPHATE 8 MG: 4 INJECTION, SOLUTION INTRAMUSCULAR; INTRAVENOUS at 08:18

## 2024-03-07 RX ADMIN — HYDROCODONE BITARTRATE AND ACETAMINOPHEN 1 TABLET: 5; 325 TABLET ORAL at 10:18

## 2024-03-07 NOTE — ANESTHESIA PREPROCEDURE EVALUATION
Anesthesia Evaluation     Patient summary reviewed and Nursing notes reviewed   NPO Solid Status: > 8 hours  NPO Liquid Status: > 2 hours           Airway   Mallampati: II  TM distance: <3 FB  Neck ROM: full  No difficulty expected  Dental - normal exam     Pulmonary - normal exam    breath sounds clear to auscultation  Cardiovascular - normal exam    Rhythm: regular  Rate: normal        Neuro/Psych  GI/Hepatic/Renal/Endo    (+) obesity    Musculoskeletal     (+) neck pain  Abdominal   (+) obese   Substance History      OB/GYN      Comment: Recent delivery 11/23/breastfeeding      Other                      Anesthesia Plan    ASA 2     general     (Patient needs )  intravenous induction     Anesthetic plan, risks, benefits, and alternatives have been provided, discussed and informed consent has been obtained with: patient.    CODE STATUS:

## 2024-03-07 NOTE — BRIEF OP NOTE
SALPINGECTOMY LAPAROSCOPIC  Progress Note    Jocelyn Lyon  3/7/2024    Pre-op Diagnosis:   Postpartum care and examination [Z39.2]  Encounter for sterilization [Z30.2]       Post-Op Diagnosis Codes:     * Postpartum care and examination [Z39.2]     * Encounter for sterilization [Z30.2]    Procedure/CPT® Codes:        Procedure(s):  BILATERAL SALPINGECTOMY LAPAROSCOPIC              Surgeon(s):  Bethany Montoya MD    Anesthesia: General    Staff:   Circulator: Mari Sarabia RN; Ksenia Colin RN  Scrub Person: Talia Dior         Estimated Blood Loss:  10mL    Urine Voided: * No values recorded between 3/7/2024  8:08 AM and 3/7/2024  9:12 AM *    Specimens:                Specimens       ID Source Type Tests Collected By Collected At Frozen?    A Fallopian Tubes, Bilateral Tissue TISSUE PATHOLOGY EXAM   Bethany Montoya MD 3/7/24 0900 No    Description: BILATERAL FALLOPIAN TUBES    This specimen was not marked as sent.                  Drains: * No LDAs found *    Findings: normal uterus, normal bilateral ovaries and fallopian tubes        Complications: none immediately noted          Bethany Montoya MD     Date: 3/7/2024  Time: 09:20 EST

## 2024-03-07 NOTE — H&P
Preoperative History and Physical    Chief complaint Desires sterilization    Subjective     History of Present Illness:  Patient is a 33 y.o. female presents for laparoscopic bilateral salpingectomy due to desires sterilization.  She denies any recent illness including fever/chills.     Family History   Problem Relation Age of Onset    Hypertension Mother     No Known Problems Father     Breast cancer Neg Hx     Ovarian cancer Neg Hx     Uterine cancer Neg Hx     Colon cancer Neg Hx     Malig Hyperthermia Neg Hx      Social History     Tobacco Use    Smoking status: Never     Passive exposure: Never    Smokeless tobacco: Never   Vaping Use    Vaping status: Never Used   Substance Use Topics    Alcohol use: Not Currently    Drug use: Never     Past Medical History:   Diagnosis Date    Breastfeeding (infant)     Cervical pain (neck)      Past Surgical History:   Procedure Laterality Date    NO PAST SURGERIES       Medications Prior to Admission   Medication Sig Dispense Refill Last Dose    acetaminophen (TYLENOL) 325 MG tablet Take 2 tablets by mouth Every 6 (Six) Hours As Needed for Mild Pain.   3/6/2024 at 1000    Hydrocortisone, Perianal, (Anusol-HC) 2.5 % rectal cream Apply to hemorrhoids 3 times daily for 7 days during hemorrhoid flare. Include applicator. Use for 7 days, then take a break for 7 days before resuming this pattern. 30 g 1 3/6/2024 at 2000    ibuprofen (ADVIL,MOTRIN) 600 MG tablet Take 1 tablet by mouth Every 6 (Six) Hours As Needed for Mild Pain.   Past Week    polyethylene glycol (MIRALAX) 17 g packet Take 17 g by mouth Daily.   3/6/2024 at 1000    methocarbamol (ROBAXIN) 500 MG tablet Take 1 tablet by mouth As Needed for Muscle Spasms.   More than a month     Patient has no known allergies.    Review of Systems:  Constitutional: negative for chills, fevers   Ears, nose, mouth, throat, and face: negative nasal congestion  Respiratory: negative for asthma and wheezing  Cardiovascular: negative for  chest pain and dyspnea  Gastrointestinal: negative for dyspepsia, dysphagia abdominal pain  Genitourinary:negative for urinary incontinence  Hematologic/lymphatic: negative for bleeding  Musculoskeletal:negative for aches  Neurological: negative for numbness/tingling  Behavioral/Psych: negative for anhedonia  Allergic/Immunologic: negative for rash, allergy        Objective     Physical Exam:    Gen:  NAD   Mental Status:   awake, alert, oriented   Heart:   well perfused, normal rate   Lungs:   no respiratory distress, respirations even   Abdomen:  no abdominal pain   Other findings noted:   See note from Office visit for complete exam     Assessment & Plan   Desires sterilization    I again discussed with the patient the risks and the benefits associated with the proposed procedure(s).  The patient has been given the opportunity to ask questions.  Alternatives to the proposed treatment (s) were discussed, including the likely results of no treatment.  The patient wishes to proceed.     Plan to proceed to OR for bilateral salpingectomy, laparoscopic  She is accompanied today by her  and gives verbal consent for me to discuss operative findings following the surgery with them  Postoperatively, she will be discharged with pain medication and follow up in 2 weeks      Bethany Montoya MD  03/07/24  08:02 EST

## 2024-03-07 NOTE — ANESTHESIA POSTPROCEDURE EVALUATION
Patient: Jocelyn Lyon    Procedure Summary       Date: 03/07/24 Room / Location: [facility] OR 09 / [facility] MAIN OR    Anesthesia Start: 0808 Anesthesia Stop: 0928    Procedure: BILATERAL SALPINGECTOMY LAPAROSCOPIC (Bilateral: Abdomen) Diagnosis:       Postpartum care and examination      Encounter for sterilization      (Postpartum care and examination [Z39.2])      (Encounter for sterilization [Z30.2])    Surgeons: Bethany Montoya MD Provider: Madi Buchanan MD    Anesthesia Type: general ASA Status: 2            Anesthesia Type: general    Vitals  Vitals Value Taken Time   /71 03/07/24 1030   Temp 36.6 °C (97.8 °F) 03/07/24 1030   Pulse 72 03/07/24 1033   Resp 16 03/07/24 1030   SpO2 99 % 03/07/24 1033   Vitals shown include unfiled device data.        Post Anesthesia Care and Evaluation    Patient location during evaluation: PHASE II  Patient participation: complete - patient participated  Level of consciousness: awake and alert  Pain management: adequate    Airway patency: patent  Anesthetic complications: No anesthetic complications  PONV Status: none  Cardiovascular status: acceptable and hemodynamically stable  Respiratory status: acceptable, nonlabored ventilation and spontaneous ventilation  Hydration status: acceptable

## 2024-03-07 NOTE — OP NOTE
OPERATIVE REPORT    Date of Service: [unfilled]    Preoperative Diagnoses: 1. Desires permanent sterilization    Postoperative Diagnoses: Same    Procedures: Laparoscopic bilateral salpingectomy    Surgeon: Bethany Montoya MD    Anesthesia: General endotracheal    Estimated Blood Loss: 10 ml    Findings: normal uterus, normal ovaries, normal bilateral fallopian tubes    Specimens: Bilateral fallopian tubes     Indications for Procedure: Jocelyn Lyon is a 33 y.o. y.o. woman who desired sterilization.  Prior to the procedure, all risks, benefits, and alternatives were discussed and informed surgical consent was signed.  Options for sterilization were reviewed and patient desired bilateral salpingectomy. She understood the operation is a permanent procedure.  She understood the risks of the procedure including but not limited to regret, failure, ectopic.    Procedure:  Patient was taken to the operating room where general anesthesia was achieved.  She was positioned in dorsal lithotomy and prepped and draped.  The bladder was drained.    An open-sided speculum was placed in the vagina.  An acorn uterine manipulator was placed and the speculum was removed.    An infraumbilical incision was made  after local anesthesia was injected and carried through the subcutaneous tissue to the fascia. Fascial incision was and peritoneal entry confirmed. Two 0-Vicryl sutures were place in the fascia to secure the Hasan Trocar.  Once in place, the abdomen was insufflated with an opening pressure of 5mmHg, and a camera inserted to visualize the abdominal cavity.   Under direct visualization, 5 mm trocars were placed in the right and left lower quadrants after transillumination to ensure no underlying vessels.     The patient was placed in steep Trendelenberg and the bowels were moved into the upper abdomen.     The fallopian tube was elevated and followed out to the fimbriated ends bilaterally.  The underlying mesosalpinx was  transected with the Ligasure to the level of the cornua.  The fallopian tubes were removed via the 12mm port in their entirety.      The pelvis was inspected and all areas were found to be hemostatic.  Pneumoperitoneum was released and the areas were also noted to be hemostatic.  All instruments were removed and the abdomen was desufflated.  All ports were removed.  The fascia at the 12 mm incisions was closed with 0 Vicryl and the skin at all incisions was closed with 4-0 Vicryl in a subcuticular fashion. The manipulator was removed from the cervix and hemostasis was obtained.    Patient tolerated the procedure well.  Sponge, lap, and instrument counts were correct.  No perioperative antibiotic prophylaxis was indicated for this procedure.  She was extubated and taken to the PACU in stable condition.

## 2024-03-08 ENCOUNTER — TELEPHONE (OUTPATIENT)
Dept: OBSTETRICS AND GYNECOLOGY | Facility: CLINIC | Age: 34
End: 2024-03-08
Payer: COMMERCIAL

## 2024-03-08 LAB
LAB AP CASE REPORT: NORMAL
PATH REPORT.FINAL DX SPEC: NORMAL
PATH REPORT.GROSS SPEC: NORMAL

## 2024-03-08 NOTE — TELEPHONE ENCOUNTER
Called patient to review pathology was benign.  She reports she is doing well. Having slight burning with urination. No major pain.  Reviewed pathology showed normal fallopian tubes. She is aware to call if dysuria does not improve by Monday.

## 2024-03-11 ENCOUNTER — TELEPHONE (OUTPATIENT)
Dept: ORTHOPEDICS | Facility: OTHER | Age: 34
End: 2024-03-11
Payer: COMMERCIAL

## 2024-03-20 ENCOUNTER — TREATMENT (OUTPATIENT)
Dept: PHYSICAL THERAPY | Facility: CLINIC | Age: 34
End: 2024-03-20
Payer: COMMERCIAL

## 2024-03-20 DIAGNOSIS — M53.82 DECREASED RANGE OF MOTION OF INTERVERTEBRAL DISCS OF CERVICAL SPINE: ICD-10-CM

## 2024-03-20 DIAGNOSIS — M50.30 DDD (DEGENERATIVE DISC DISEASE), CERVICAL: ICD-10-CM

## 2024-03-20 DIAGNOSIS — R29.898 IMPAIRED FLEXIBILITY OF UPPER EXTREMITY: ICD-10-CM

## 2024-03-20 DIAGNOSIS — M54.2 PAIN, NECK: Primary | ICD-10-CM

## 2024-03-20 NOTE — PROGRESS NOTES
Physical Therapy Daily Progress Note                                            [address], [address]                                                           [place], [place]                                                         [phone]    Patient: Melvin Lyon   : 1990  Diagnosis/ICD-10 Code:  Pain, neck [M54.2]  Referring practitioner: JOANNA Parham  Date of Initial Visit: Type: THERAPY  Noted: 2024  Today's Date: 3/20/2024  Patient seen for 4 sessions           Subjective Evaluation    History of Present Illness    Subjective comment: melvin reports that she has some days without pain and some days with severe neck pain.       Objective   See Exercise, Manual, and Modality Logs for complete treatment.       Assessment & Plan       Assessment  Assessment details: Melvin completed all stretching and strengthening activities without significant complaints of pain. She tolerated progression of strengthening exercises this date with increased repetitions. Prone scapulothoracic strengthening was initiated today in order to assist with further improving her posture and bilateral shoulder stabilization. Pending reported levels of soreness, may attempt STM to suboccipitals and upper trap muscles next session.         Progress per Plan of Care           Manual Therapy:    0     mins  39495;  Therapeutic Exercise:    29     mins  65110;     Neuromuscular Ese:    0    mins  58096;    Therapeutic Activity:     10     mins  55872;     Gait Trainin     mins  23342;     Ultrasound:     0     mins  91547;    Electrical Stimulation:    0     mins  48353 ( );  Dry Needling     0     mins self-pay    Timed Treatment:   39   mins   Total Treatment:     49   mins    Derick Jin PT, DPT  Physical Therapist  [place] License #: [id number]  Derick Jin PT, 24, 2:36 PM EDT

## 2024-03-26 ENCOUNTER — OFFICE VISIT (OUTPATIENT)
Dept: OBSTETRICS AND GYNECOLOGY | Facility: CLINIC | Age: 34
End: 2024-03-26
Payer: COMMERCIAL

## 2024-03-26 VITALS
DIASTOLIC BLOOD PRESSURE: 88 MMHG | BODY MASS INDEX: 31.05 KG/M2 | HEART RATE: 86 BPM | WEIGHT: 193.2 LBS | SYSTOLIC BLOOD PRESSURE: 123 MMHG | HEIGHT: 66 IN

## 2024-03-26 DIAGNOSIS — Z09 POSTOPERATIVE FOLLOW-UP: Primary | ICD-10-CM

## 2024-03-26 PROCEDURE — 99024 POSTOP FOLLOW-UP VISIT: CPT | Performed by: OBSTETRICS & GYNECOLOGY

## 2024-03-26 PROCEDURE — 1160F RVW MEDS BY RX/DR IN RCRD: CPT | Performed by: OBSTETRICS & GYNECOLOGY

## 2024-03-26 PROCEDURE — 1159F MED LIST DOCD IN RCRD: CPT | Performed by: OBSTETRICS & GYNECOLOGY

## 2024-03-26 NOTE — PROGRESS NOTES
"Chief Complaint   Patient presents with    Follow-up     Check up, no issues      Jocelyn Lyon is a 33 y.o. female who presents to the clinic status post  laparoscopic bilateral salpingectomy on 3/7/24  for sterilization    Eating a regular diet without difficulty. Spotting has stopped. Her umbilical incision is not painful, but she feels it hasn't healed as much    Past Medical History:   Diagnosis Date    Breastfeeding (infant)     Cervical pain (neck)      Past Surgical History:   Procedure Laterality Date    NO PAST SURGERIES      SALPINGECTOMY Bilateral 3/7/2024    Procedure: BILATERAL SALPINGECTOMY LAPAROSCOPIC;  Surgeon: Bethany Montoya MD;  Location: [facility] OR;  Service: Obstetrics/Gynecology;  Laterality: Bilateral;     Social History     Tobacco Use    Smoking status: Never     Passive exposure: Never    Smokeless tobacco: Never   Vaping Use    Vaping status: Never Used   Substance Use Topics    Alcohol use: Not Currently    Drug use: Never     Family History   Problem Relation Age of Onset    Hypertension Mother     No Known Problems Father     Breast cancer Neg Hx     Ovarian cancer Neg Hx     Uterine cancer Neg Hx     Colon cancer Neg Hx     Malig Hyperthermia Neg Hx          Review of Systems    OBJECTIVE:   Vitals:    03/26/24 1059   BP: 123/88   Pulse: 86   Weight: 87.6 kg (193 lb 3.2 oz)   Height: 167.6 cm (65.98\")        Physical Exam  Exam conducted with a chaperone present.   Constitutional:       General: She is not in acute distress.     Appearance: She is well-developed. She is not diaphoretic.   HENT:      Head: Normocephalic and atraumatic.   Eyes:      General: No scleral icterus.     Extraocular Movements: Extraocular movements intact.   Pulmonary:      Effort: Pulmonary effort is normal. No respiratory distress.   Abdominal:      Comments: Incisions healing well without erythema or induration or drainage, small suture protruding from right lower quadrant incision   Skin:     " General: Skin is warm and dry.   Neurological:      General: No focal deficit present.      Mental Status: She is alert and oriented to person, place, and time.   Psychiatric:         Mood and Affect: Mood normal.         Behavior: Behavior normal.         Thought Content: Thought content normal.         Judgment: Judgment normal.         Lab Results   Component Value Date    FINALDX  03/07/2024     1.  Fallopian tubes, bilateral, salpingectomy:   A.  Benign fimbriated fallopian tubes.               ASSESSMENT:  (Z09) Postoperative follow-up      PLAN:   Reviewed postop instructions, activity restrictions   Reviewed pathology and pictures  Pain control adequate  Incisions healing well - suture removed from right sided incision  RTO for annual exams or PRN

## 2024-03-28 ENCOUNTER — OFFICE VISIT (OUTPATIENT)
Dept: SURGERY | Facility: CLINIC | Age: 34
End: 2024-03-28
Payer: COMMERCIAL

## 2024-03-28 VITALS
SYSTOLIC BLOOD PRESSURE: 116 MMHG | OXYGEN SATURATION: 99 % | DIASTOLIC BLOOD PRESSURE: 80 MMHG | WEIGHT: 188.1 LBS | HEIGHT: 66 IN | BODY MASS INDEX: 30.23 KG/M2 | HEART RATE: 95 BPM

## 2024-03-28 DIAGNOSIS — K64.4 EXTERNAL HEMORRHOIDS WITH COMPLICATION: Primary | ICD-10-CM

## 2024-03-28 PROBLEM — R03.0 ELEVATED BLOOD-PRESSURE READING WITHOUT DIAGNOSIS OF HYPERTENSION: Status: ACTIVE | Noted: 2021-03-06

## 2024-03-28 PROBLEM — O99.810 PREGNANCY WITH ABNORMAL GLUCOSE TOLERANCE TEST: Status: ACTIVE | Noted: 2021-03-06

## 2024-03-28 PROBLEM — Z67.41 TYPE O BLOOD, RH NEGATIVE: Status: ACTIVE | Noted: 2021-03-06

## 2024-03-28 PROBLEM — M25.559 HIP PAIN: Status: ACTIVE | Noted: 2021-03-30

## 2024-03-28 PROBLEM — O23.40 URINARY TRACT INFECTION IN MOTHER DURING PREGNANCY: Status: ACTIVE | Noted: 2021-02-12

## 2024-03-28 PROBLEM — M94.0 COSTOCHONDRITIS: Status: ACTIVE | Noted: 2021-03-24

## 2024-03-28 PROBLEM — Z22.330 CARRIER OF GROUP B STREPTOCOCCUS: Status: ACTIVE | Noted: 2021-03-06

## 2024-03-28 PROBLEM — R10.11 RIGHT UPPER QUADRANT PAIN: Status: ACTIVE | Noted: 2021-03-24

## 2024-03-28 PROCEDURE — 1159F MED LIST DOCD IN RCRD: CPT | Performed by: PHYSICIAN ASSISTANT

## 2024-03-28 PROCEDURE — 99214 OFFICE O/P EST MOD 30 MIN: CPT | Performed by: PHYSICIAN ASSISTANT

## 2024-03-28 PROCEDURE — 1160F RVW MEDS BY RX/DR IN RCRD: CPT | Performed by: PHYSICIAN ASSISTANT

## 2024-03-28 NOTE — PROGRESS NOTES
"Jocelyn Lyon is a 33 y.o. female in for follow up of external hemorrhoids and rectal bleeding.    The patient states that she is feeling much better.  She has not had any further rectal bleeding.  She occasionally has rectal pain when wiping after a bowel movement.  She is having daily bowel movements, [place] 4 stool consistency without straining.  She is taking MiraLAX and fiber Gummies.  She is using Anusol cream, which is helping.  She does feel like there is some residual swelling that has not resolved.  She is asking if it is possible to use a suppository, as she has difficulty inserting the Anusol cream applicator.    /80 (BP Location: Left arm, Patient Position: Sitting, Cuff Size: Adult)   Pulse 95   Ht 167.6 cm (66\")   Wt 85.3 kg (188 lb 1.6 oz)   LMP  (LMP Unknown)   SpO2 99%   Breastfeeding Yes   BMI 30.36 kg/m²   Body mass index is 30.36 kg/m².  -  Physical Exam  No acute distress  Chaperone present  Perianal exam: external hemorrhoids mildly enlarged. Tenderness to palpation. No fissure visualized.    Assessment:   1. External hemorrhoids with complication       Plan:  I recommend that the patient continue her bowel regimen and Anusol cream.  I discussed with her that she can apply the cream externally only, unless bleeding should recur.  I also discussed with her the option of a colonoscopy.  She would like to try the cream for another 4 to 6 weeks before scheduling a colonoscopy.  Follow up in 4-6 weeks.  Due to language barrier, an  was present during the history-taking and subsequent discussion (and for part of the physical exam) with this patient.    Time Spent: I spent 32 minutes caring for Jocelyn on this date of service. This time includes time spent by me in the following activities: preparing for the visit, performing a medically appropriate examination and/or evaluation, counseling and educating the patient/family/caregiver, documenting information in the " medical record, and care coordination.     Chantelle Mayo PA-C  Physician Assistant  Colorectal Surgery

## 2024-04-12 ENCOUNTER — OFFICE VISIT (OUTPATIENT)
Dept: ORTHOPEDIC SURGERY | Facility: CLINIC | Age: 34
End: 2024-04-12
Payer: COMMERCIAL

## 2024-04-12 VITALS — TEMPERATURE: 97.7 F | HEIGHT: 65 IN | BODY MASS INDEX: 32.29 KG/M2 | WEIGHT: 193.8 LBS

## 2024-04-12 DIAGNOSIS — M50.30 DDD (DEGENERATIVE DISC DISEASE), CERVICAL: Primary | ICD-10-CM

## 2024-04-12 PROCEDURE — 1160F RVW MEDS BY RX/DR IN RCRD: CPT | Performed by: NURSE PRACTITIONER

## 2024-04-12 PROCEDURE — 1159F MED LIST DOCD IN RCRD: CPT | Performed by: NURSE PRACTITIONER

## 2024-04-12 PROCEDURE — 99213 OFFICE O/P EST LOW 20 MIN: CPT | Performed by: NURSE PRACTITIONER

## 2024-04-12 NOTE — PROGRESS NOTES
Patient Name: Jocelyn Lyon   YOB: 1990  Referring Primary Care Physician: Donita De APRN      Chief Complaint:    Chief Complaint   Patient presents with    Cervical Spine - Follow-up, Pain            HPI:  Jocelyn Lyon is a 33 y.o. female who presents to [facility] for follow-up of neck pain following physical therapy.  She is accompanied by an  today as patient speaks Macedonian.  Symptoms are stable since last visit referring to bilateral shoulders.  She says wearing any thing around her neck such as a turtleneck increases the pain.  She also had an episode last week where she felt dizzy and lightheaded after going from a seated to standing position.    PFSH:  See attached    ROS: As per HPI, otherwise negative    Objective:      33 y.o. female  Body mass index is 32.25 kg/m²., 87.9 kg (193 lb 12.8 oz), @HT@  Vitals:    04/12/24 1334   Temp: 97.7 °F (36.5 °C)     Pain Score    04/12/24 1334   PainSc:   8   PainLoc: Back            Spine Musculoskeletal Exam    Inspection    Coronal balance: no imbalance    Sagittal balance: no imbalance    Range of Motion    Cervical Spine       Cervical flexion detail: pain and crepitus.     Cervical extension detail: pain and crepitus.       Right      Lateral bending detail: pain and crepitus.       Left      Lateral bending detail: pain and crepitus.      Sensory    Cervical Spine    Cervical spine sensation is normal.    Reflexes    Right        Biceps: 2/4      Brachioradialis: 2/4      Triceps: 2/4    Left        Biceps: 2/4        Brachioradialis: 2/4      Triceps: 2/4    General      Constitutional: well-developed and well-nourished    Scleral icterus: no    Labored breathing: no    Psychiatric: normal mood and affect and no acute distress    Neurological: alert and oriented x3    Skin: intact        IMAGING:       No new imaging in office today    Assessment:           Diagnoses and all orders  for this visit:    1. DDD (degenerative disc disease), cervical (Primary)  -     MRI Cervical Spine Without Contrast; Future             Plan:  As discussed at last visit, when she completed physical therapy we will get cervical MRI if still symptomatic.  Will submit for noncontrasted cervical MRI with an eye toward injection therapy.  We did discuss that options today and she is interested, but will have her follow-up after MRI due to the language barrier so that we can discuss the results and possible injection options.  Of course if there is anything of surgical concern, we will get her into one of the neurosurgeons, however she has no loss of nerve function on exam so not likely going to be the case.  She also wants to avoid surgery at all cost.  For the episode of dizziness last week, this sounds presyncopal and was advised to discuss this with her primary care physician.     Return for After radiographic tests.    EMR Dragon/Transcription Disclaimer:   Much of this encounter note is an electronic transcription/translation of spoken language to printed text. The electronic translation of spoken language may permit erroneous, or at times, nonsensical words or phrases to be inadvertently transcribed; Although I have reviewed the note for such errors, some may still exist.  Red flags have been discussed at this or previous visits to include but not limited weakness in extremities, worsening pain that does not respond to conservative treatment and bowel or bladder dysfunction. These are reasons to present to ER and patient has been informed.    The diagnosis(es), natural history, pathophysiology and treatment for diagnosis(es) were discussed. Opportunity given and questions answered. Biomechanics of pertinent body areas discussed.    EXERCISES:  Advice on benefits of, and types of regular/moderate exercise pertaining to diagnosis.  Continue HEP. For back or neck pain, recommend pilates and or yoga as tolerated.  Generally it is best to start any new exercise under the guidance of a  or therapist.   MEDICATIONS:  When prescribe, the risks, benefits, warnings,side effects and alternatives of medications discussed. Advised against long term use of narcotics.   PAIN CONTROL:  Cold, heat, OTC lidocaine patches and/or ointment as needed. Avoid direct skin contact with ice. Ice 15-20 minutes 3-4 times daily as needed. For SI joint pain, recommend ice bath in water about 50 degrees for 5 consecutive days, add ice slowly to help with adjustment and may cover with warm towel or robe to help with cold tolerance. If using lidocaine, do not apply heat in conjunction as this can cause a burn.   MEDICAL RECORDS reviewed from other provider(s) for past and current medical history pertinent to this visit..

## 2024-04-17 NOTE — ANESTHESIA PROCEDURE NOTES
Airway  Urgency: elective    Date/Time: 3/7/2024 8:17 AM  Airway not difficult    General Information and Staff    Patient location during procedure: OR  CRNA/CAA: Jennifer Armstrong CRNA    Indications and Patient Condition  Indications for airway management: airway protection    Preoxygenated: yes  Mask difficulty assessment: 1 - vent by mask    Final Airway Details  Final airway type: endotracheal airway      Successful airway: ETT  Cuffed: yes   Successful intubation technique: direct laryngoscopy  Facilitating devices/methods: intubating stylet  Endotracheal tube insertion site: oral  Blade: Kiko  Blade size: 3  ETT size (mm): 7.0  Cormack-Lehane Classification: grade I - full view of glottis  Placement verified by: chest auscultation and capnometry   Cuff volume (mL): 8  Measured from: lips  Number of attempts at approach: 1  Assessment: lips, teeth, and gum same as pre-op and atraumatic intubation            
I attest my time as attending is greater than 50% of the total combined time spent on qualifying patient care activities by the PA/NP and attending.
I attest my time as attending is greater than 50% of the total combined time spent on qualifying patient care activities by the PA/NP and attending.

## 2024-04-22 ENCOUNTER — HOSPITAL ENCOUNTER (OUTPATIENT)
Dept: MRI IMAGING | Facility: HOSPITAL | Age: 34
Discharge: HOME | End: 2024-04-22
Admitting: NURSE PRACTITIONER
Payer: COMMERCIAL

## 2024-04-22 DIAGNOSIS — M50.30 DDD (DEGENERATIVE DISC DISEASE), CERVICAL: ICD-10-CM

## 2024-04-22 PROCEDURE — 72141 MRI NECK SPINE W/O DYE: CPT

## 2024-04-30 ENCOUNTER — OFFICE VISIT (OUTPATIENT)
Dept: ORTHOPEDIC SURGERY | Facility: CLINIC | Age: 34
End: 2024-04-30
Payer: COMMERCIAL

## 2024-04-30 VITALS — WEIGHT: 194 LBS | TEMPERATURE: 97.8 F | BODY MASS INDEX: 32.32 KG/M2 | HEIGHT: 65 IN

## 2024-04-30 DIAGNOSIS — E23.6 EMPTY SELLA: ICD-10-CM

## 2024-04-30 DIAGNOSIS — M54.2 NECK PAIN: Primary | ICD-10-CM

## 2024-04-30 PROCEDURE — 1160F RVW MEDS BY RX/DR IN RCRD: CPT | Performed by: NURSE PRACTITIONER

## 2024-04-30 PROCEDURE — 1159F MED LIST DOCD IN RCRD: CPT | Performed by: NURSE PRACTITIONER

## 2024-04-30 PROCEDURE — 99213 OFFICE O/P EST LOW 20 MIN: CPT | Performed by: NURSE PRACTITIONER

## 2024-04-30 NOTE — PROGRESS NOTES
Patient Name: Jocelyn Lyon   YOB: 1990  Referring Primary Care Physician: Donita De APRN      Chief Complaint:    Chief Complaint   Patient presents with    Cervical Spine - Pain, Follow-up            HPI:  Jocelyn Lyon is a 33 y.o. female who presents to [facility] for follow-up of cervical MRI for primary complaint of neck pain.  She is accompanied by Maori-speaking  today.  Symptoms are unchanged since last visit.    PFSH:  See attached    ROS: As per HPI, otherwise negative    Objective:      33 y.o. female  Body mass index is 32.28 kg/m²., 88 kg (194 lb), @HT@  Vitals:    04/30/24 1435   Temp: 97.8 °F (36.6 °C)     Pain Score    04/30/24 1435   PainSc:   4   PainLoc: Neck            Spine Musculoskeletal Exam    Gait    Gait is normal.    General      Constitutional: well-developed and well-nourished    Scleral icterus: no    Labored breathing: no    Psychiatric: normal mood and affect and no acute distress    Neurological: alert and oriented x3    Skin: intact        IMAGING:     Cervical MRI was reviewed with the patient reveals no significant canal or foraminal narrowing, no disc herniations, subluxation or fractures, slight central disc bulge at C6-7.  Agree with report.  Radiologist also    Assessment:           Diagnoses and all orders for this visit:    1. Neck pain (Primary)  -     Ambulatory Referral to Physical Therapy    2. Empty sella  -     Ambulatory Referral to Neurology             Plan:  MRI is essentially normal, would recommend she make a concerted effort with PT since she had to cancel most of her appointments.  For the empty sella, will refer to neurology or she can follow up with PCP.  She does report chronic headache and fatigue which could be related to the partially empty sella.  Pain managmenet not likely an option given the unremarkable MRI. She asks what can be causing the pain, we discussed  multifactorial contributors, but it is not structural. Therapy can teach proper body mechanics, offer pain relieving techniques, stretching and strengthening.  Also recommend trying lidocaine patches and/or creams. She says while in [place] she was diagnosed with what sounds like an osteophyte which is not evident on this MRI. Follow up as needed.    Return if symptoms worsen or fail to improve.    EMR Dragon/Transcription Disclaimer:   Much of this encounter note is an electronic transcription/translation of spoken language to printed text. The electronic translation of spoken language may permit erroneous, or at times, nonsensical words or phrases to be inadvertently transcribed; Although I have reviewed the note for such errors, some may still exist.  Red flags have been discussed at this or previous visits to include but not limited weakness in extremities, worsening pain that does not respond to conservative treatment and bowel or bladder dysfunction. These are reasons to present to ER and patient has been informed.    The diagnosis(es), natural history, pathophysiology and treatment for diagnosis(es) were discussed. Opportunity given and questions answered. Biomechanics of pertinent body areas discussed.    EXERCISES:  Advice on benefits of, and types of regular/moderate exercise pertaining to diagnosis.  Continue HEP. For back or neck pain, recommend pilates and or yoga as tolerated. Generally it is best to start any new exercise under the guidance of a  or therapist.   MEDICATIONS:  When prescribe, the risks, benefits, warnings,side effects and alternatives of medications discussed. Advised against long term use of narcotics.   PAIN CONTROL:  Cold, heat, OTC lidocaine patches and/or ointment as needed. Avoid direct skin contact with ice. Ice 15-20 minutes 3-4 times daily as needed. For SI joint pain, recommend ice bath in water about 50 degrees for 5 consecutive days, add ice slowly to help with  adjustment and may cover with warm towel or robe to help with cold tolerance. If using lidocaine, do not apply heat in conjunction as this can cause a burn.   MEDICAL RECORDS reviewed from other provider(s) for past and current medical history pertinent to this visit..

## 2024-05-15 ENCOUNTER — TELEPHONE (OUTPATIENT)
Dept: ORTHOPEDICS | Facility: OTHER | Age: 34
End: 2024-05-15
Payer: COMMERCIAL

## 2024-05-23 ENCOUNTER — TREATMENT (OUTPATIENT)
Dept: PHYSICAL THERAPY | Facility: CLINIC | Age: 34
End: 2024-05-23
Payer: COMMERCIAL

## 2024-05-23 DIAGNOSIS — M53.82 DECREASED RANGE OF MOTION OF INTERVERTEBRAL DISCS OF CERVICAL SPINE: ICD-10-CM

## 2024-05-23 DIAGNOSIS — R29.898 DECREASED STRENGTH OF UPPER EXTREMITY: ICD-10-CM

## 2024-05-23 DIAGNOSIS — M54.2 PAIN, NECK: Primary | ICD-10-CM

## 2024-05-23 DIAGNOSIS — Z78.9 IMPAIRED ACTIVITIES OF DAILY LIVING: ICD-10-CM

## 2024-05-23 DIAGNOSIS — R29.3 POOR POSTURE: ICD-10-CM

## 2024-05-23 NOTE — PROGRESS NOTES
Physical Therapy Initial Evaluation and Plan of Care                                               [address], [address]                                                           [place], [place]                                                         [phone]    Patient: Jocelyn Lyon   : 1990  Diagnosis/ICD-10 Code:  Pain, neck [M54.2]  Referring practitioner: JOANNA Parham  Date of Initial Visit: 2024  Today's Date: 2024  Patient seen for 1 sessions           Subjective Questionnaire: NDI:25      Subjective Evaluation    History of Present Illness  Mechanism of injury: iPad  used to gain subjective information and to discuss POC.    Pt reports chronic neck and mid back pain ongoing for many years which has become debilitating over the past 5-6 months. Her pain is felt along the entirety of her posterior neck, mid-back, and bilateral shoulders. She states that the pain is constant and severe, but is worsened by repetitive activity, house chores, lifting, and overhead motion of any kind. She has young children and a baby to care for which also increases her pain. She has very frequent headaches, a few times per day, which cause her to lie down and/ or sleep. Pt also reports consistent swelling in both of her hands that occurs nearly every day.    She was seen previously by this clinic for the same symptoms but was unable to consistently attend treatment sessions due to lack of childcare. Her previous bout of therapy consisted of cervical and scapular stretching and strengthening. She states that she has continued to perform her HEP as often as possible, but that it is hard to keep up with it because she just finished moving. Some exercises help and some worsen her pain.     MRI on 24 showed slight central disc bulge at C6-C7 but was otherwise unremarkable.      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, sitting, applying makeup Quality  of life: good    Pain  Current pain rating: 10  At worst pain rating: 10  Location: cervical spine radiating into both arms  Quality: tight, throbbing, squeezing, pulling, sharp and discomfort  Relieving factors: medications, relaxation and rest (tylenol)  Aggravating factors: sleeping, prolonged positioning, outstretched reach, repetitive movement, lifting, movement and overhead activity  Progression: no change    Social Support  Lives with: young children    Diagnostic Tests  MRI studies: normal    Treatments  Previous treatment: physical therapy  Current treatment: medication  Patient Goals  Patient goals for therapy: decreased pain, increased motion, independence with ADLs/IADLs and increased strength             Objective          Static Posture     Head  Forward.    Shoulders  Rounded.    Postural Observations  Seated posture: fair  Standing posture: fair      Palpation   Left   Tenderness of the cervical paraspinals, levator scapulae, lower trapezius, middle trapezius, suboccipitals and upper trapezius.     Right Tenderness of the cervical paraspinals, levator scapulae, lower trapezius, middle trapezius, suboccipitals and upper trapezius.     Tenderness   Cervical Spine   Tenderness in the spinous process, left scapula, left transverse process and right scapula.     Left Shoulder   Tenderness in the clavicle.     Right Shoulder  Tenderness in the clavicle.     Neurological Testing     Sensation   Cervical/Thoracic   Left   Intact: light touch    Right   Intact: light touch    Active Range of Motion   Cervical/Thoracic Spine   Cervical    Flexion: 35 degrees with pain  Extension: 38 degrees with pain  Left lateral flexion: 40 degrees with pain  Right lateral flexion: 35 degrees with pain  Left rotation: 55 degrees with pain  Right rotation: 60 degrees with pain    Strength/Myotome Testing   Cervical Spine   Neck extension: 4  Neck flexion: 4    Left   Neck lateral flexion (C3): 4    Right   Neck lateral  flexion (C3): 4    Left Shoulder     Planes of Motion   Flexion: 4-   Abduction: 4-     Right Shoulder     Planes of Motion   Flexion: 4-   Abduction: 4-     Left Elbow   Flexion: 4-  Extension: 4-    Right Elbow   Flexion: 4-  Extension: 4-    Additional Strength Details  MMT limited by pain          Assessment & Plan       Assessment  Impairments: abnormal or restricted ROM, activity intolerance, impaired physical strength, lacks appropriate home exercise program and pain with function   Functional limitations: carrying objects, lifting, sleeping, walking, pulling, pushing, uncomfortable because of pain, moving in bed, sitting, standing, reaching overhead and unable to perform repetitive tasks   Assessment details: Jocelyn is a 32 y/o female reporting to OP PT for initial evaluation regarding chronic neck pain which has been present for many years but has become debilitating over the past 5-6 months. She describes bilateral neck pain which starts at the base of her skull and radiates into bilateral shoulders and into her mid-back at worst. The pain is constant and severe and is worsened with all upright activity including sitting and standing. She has great pain when performing household chores and caring for her children. Jocelyn is not able to lift very heavy weights due to her pain. She has daily headaches which are severe and cause her to lie down. Also notes frequent swelling in both of her hands. She was treated previously by this clinic for the same symptoms and came for 3 treatment sessions, but her attendance was limited due to not having childcare. Upon evaluation, she is significantly tender to palpation along all cervical and thoracic spine musculature and bony structures, bilateral shoulders and scapulae. She demonstrates deficits in cervical and BUE strength, but testing was limited secondary to pain. Following STM to painful areas, she reported good relief of symptoms so this treatment will be  continued alongside e-stim and application of heat. Skilled OP PT is deemed reasonable and necessary in order to address aforementioned deficits, limitations, and impairments and assist with improving ability to perform ADLs and work duties without pain.   Prognosis: good    Goals  Plan Goals: Goals  Plan Goals: Short Term: 4 weeks  1. Pt will be independent with initial HEP  2. Pt will report 50% decrease in cervical pain  3. Pt will demonstrate improved cervical AROM to WFL in all planes of motion without reports of pain for indication of improved ability to perform all ADLs   4. Pt will report improved sleep throughout the night with less disturbance from her neck pain     Long Term: 8 weeks  1. Pt will be independent with progressed HEP  2. Pt will report 0-2/10 pain in her neck  3. Pt will demonstrate improved posture including neutral cervical and scapular position for approximately 75% of her treatment session for indication of improved postural awareness and decreased neck pain  4. Pt will demonstrate full bilateral shoulder range of motion without reports of cervical pain for indication of improved ability to perform all ADLs  5. Pt will report less than one occurrence of bilateral hand swelling   6. Pt will report improved ability to sit and stand for longer than 30 minutes for indicaiton of improved ability to perform all ADLs  7. Pt will report return to or ability to return to the gym or walk for exercise       Plan  Therapy options: will be seen for skilled therapy services  Planned modality interventions: cryotherapy, electrical stimulation/Russian stimulation and thermotherapy (hydrocollator packs)  Planned therapy interventions: manual therapy, neuromuscular re-education, strengthening, stretching, therapeutic activities, home exercise program and gait training  Frequency: 2x week  Duration in weeks: 8  Treatment plan discussed with: patient        Manual Therapy:    10     mins  07471;  Therapeutic  Exercise:    0     mins  46620;     Neuromuscular Ese:    0    mins  23593;    Therapeutic Activity:     0     mins  57469;     Gait Trainin     mins  89699;     Ultrasound:     0     mins  83621;    Electrical Stimulation:    15     mins  16545 ( );  Dry Needling     0     mins self-pay    Timed Treatment:   10   mins   Total Treatment:     55   mins    PT SIGNATURE: Derick Jin PT, DPT  [place] License #: [id number]   Derick Jin PT, 24, 3:03 PM EDT  DATE TREATMENT INITIATED: 2024    Initial Certification  Certification Period: 2024  I certify that the therapy services are furnished while this patient is under my care.  The services outlined above are required by this patient, and will be reviewed every 90 days.     PHYSICIAN: Amelia Florez, JOANNA      DATE:     Please sign and return via fax to [phone].. Thank you, [facility].

## 2024-05-28 ENCOUNTER — TELEPHONE (OUTPATIENT)
Dept: ORTHOPEDIC SURGERY | Facility: CLINIC | Age: 34
End: 2024-05-28
Payer: COMMERCIAL

## 2024-05-28 DIAGNOSIS — E23.6 EMPTY SELLA: Primary | ICD-10-CM

## 2024-05-28 NOTE — TELEPHONE ENCOUNTER
"Amelia    RE: Referral to Dr. Singh      Received the following message on 05/28/2024 from Dr. Singh's office....    \"PER NEURO; Decline--Please let referring provider office know per Dr. Singh pt should see an endocrinologist\"      Please advise.  "

## 2024-05-31 ENCOUNTER — TREATMENT (OUTPATIENT)
Dept: PHYSICAL THERAPY | Facility: CLINIC | Age: 34
End: 2024-05-31
Payer: COMMERCIAL

## 2024-05-31 DIAGNOSIS — Z78.9 IMPAIRED ACTIVITIES OF DAILY LIVING: ICD-10-CM

## 2024-05-31 DIAGNOSIS — R29.3 POOR POSTURE: ICD-10-CM

## 2024-05-31 DIAGNOSIS — M54.2 PAIN, NECK: Primary | ICD-10-CM

## 2024-05-31 DIAGNOSIS — R29.898 DECREASED STRENGTH OF UPPER EXTREMITY: ICD-10-CM

## 2024-05-31 DIAGNOSIS — M53.82 DECREASED RANGE OF MOTION OF INTERVERTEBRAL DISCS OF CERVICAL SPINE: ICD-10-CM

## 2024-05-31 NOTE — PROGRESS NOTES
Physical Therapy Daily Progress Note                                            [address]                                                           [place], [place]                                                         [phone]    Patient: Jocelyn Lyon   : 1990  Diagnosis/ICD-10 Code:  Pain, neck [M54.2]  Referring practitioner: JOANNA Parham  Date of Initial Visit: Type: THERAPY  Noted: 2024  Today's Date: 2024  Patient seen for 2 sessions           Subjective Evaluation    History of Present Illness    Subjective comment: Jocelyn reports no change in her neck pain.       Objective   See Exercise, Manual, and Modality Logs for complete treatment.       Assessment & Plan       Assessment  Assessment details: Jocelyn tolerated her session well without reports of increased neck pain. She has slight relief with STM but the treatment did not completely relieve her pain. Cervical isometric strengthening was initiated today in order to assist with improving stabilization of her neck in weight-bearing positions. Scapular strengthening was progressed to include theraband resistance today and she was encouraged to maintain neutral cervical posture during this in order to not increase strain on her neck as was felt when she relaxed into cervical protraction. We will continue progressing scapulothoracic strength as tolerated in order to improve posture and decrease strain on her neck.         Progress per Plan of Care           Manual Therapy:    10     mins  12863;  Therapeutic Exercise:    20     mins  84111;     Neuromuscular Ese:    10    mins  18268;    Therapeutic Activity:     0     mins  78018;     Gait Trainin     mins  50474;     Ultrasound:     0     mins  75949;    Electrical Stimulation:    15     mins  30127 ( );  Dry Needling     0     mins self-pay    Timed Treatment:   40   mins   Total Treatment:     55   mins    Derick Jin  PT, DPT  Physical Therapist  [place] License #: [id number]  Derick Jin, PT, 05/31/24, 1:05 PM EDT

## 2024-06-04 ENCOUNTER — OFFICE VISIT (OUTPATIENT)
Dept: SURGERY | Facility: CLINIC | Age: 34
End: 2024-06-04
Payer: COMMERCIAL

## 2024-06-04 VITALS
HEART RATE: 97 BPM | BODY MASS INDEX: 32.07 KG/M2 | DIASTOLIC BLOOD PRESSURE: 80 MMHG | SYSTOLIC BLOOD PRESSURE: 120 MMHG | HEIGHT: 65 IN | OXYGEN SATURATION: 98 % | WEIGHT: 192.5 LBS

## 2024-06-04 DIAGNOSIS — K64.4 EXTERNAL HEMORRHOIDS WITH COMPLICATION: ICD-10-CM

## 2024-06-04 DIAGNOSIS — K62.89 RECTAL PAIN: Primary | ICD-10-CM

## 2024-06-04 PROCEDURE — 99213 OFFICE O/P EST LOW 20 MIN: CPT | Performed by: PHYSICIAN ASSISTANT

## 2024-06-04 PROCEDURE — 1160F RVW MEDS BY RX/DR IN RCRD: CPT | Performed by: PHYSICIAN ASSISTANT

## 2024-06-04 PROCEDURE — 1159F MED LIST DOCD IN RCRD: CPT | Performed by: PHYSICIAN ASSISTANT

## 2024-06-04 RX ORDER — SODIUM CHLORIDE, SODIUM LACTATE, POTASSIUM CHLORIDE, CALCIUM CHLORIDE 600; 310; 30; 20 MG/100ML; MG/100ML; MG/100ML; MG/100ML
30 INJECTION, SOLUTION INTRAVENOUS CONTINUOUS
OUTPATIENT
Start: 2024-06-04

## 2024-06-04 NOTE — PROGRESS NOTES
"Jocelyn Lyon is a 33 y.o. female in for follow up of external hemorrhoids.    The patient states that her hemorrhoid related symptoms are not terrible but not yet resolved.  She denies any recent rectal bleeding but reports occasional rectal pain with bowel movements when she is having harder stools.  She has a bowel movement daily, which is normally [place] 3-4 stool consistency.  She does strain with bowel movements when the stool is hard.  She is not able to tell me the exact frequency of how often she has these harder stools, but describes it as occasional.  She is taking 1 dose of MiraLAX daily, as well as 2 fiber Gummies nightly.  She has not tried taking 2 fiber Gummies in the morning and 2 fiber Gummies in the evening, and she has not yet tried taking 1 dose of MiraLAX twice daily.  She is using topical 2.5% hydrocortisone twice daily, which alleviates her pain.    /80 (BP Location: Left arm, Patient Position: Sitting, Cuff Size: Adult)   Pulse 97   Ht 165.1 cm (65\")   Wt 87.3 kg (192 lb 8 oz)   LMP  (LMP Unknown) Comment: BILATERAL SALPINGECTOMY  SpO2 98%   Breastfeeding No   BMI 32.03 kg/m²   Body mass index is 32.03 kg/m².  -  Physical Exam  No acute distress  Chaperone present  Perianal exam: external hemorrhoids mildly enlarged. RICHA: no masses; pain elicited. Anoscopy deferred due to patient discomfort.    Assessment:   1. Rectal pain    2. External hemorrhoids with complication       Plan:  Try increasing miralax to twice daily dosing and/or increasing fiber to 2 gummies twice daily.  Schedule colonoscopy to ensure no other colorectal pathology.  Continue 2.5% hydrocortisone cream applied external hemorrhoids    Chantelle Mayo PA-C  Physician Assistant  Colorectal Surgery    "

## 2024-06-07 ENCOUNTER — TREATMENT (OUTPATIENT)
Dept: PHYSICAL THERAPY | Facility: CLINIC | Age: 34
End: 2024-06-07
Payer: COMMERCIAL

## 2024-06-07 DIAGNOSIS — R29.3 POOR POSTURE: ICD-10-CM

## 2024-06-07 DIAGNOSIS — Z78.9 IMPAIRED ACTIVITIES OF DAILY LIVING: ICD-10-CM

## 2024-06-07 DIAGNOSIS — R29.898 DECREASED STRENGTH OF UPPER EXTREMITY: ICD-10-CM

## 2024-06-07 DIAGNOSIS — M53.82 DECREASED RANGE OF MOTION OF INTERVERTEBRAL DISCS OF CERVICAL SPINE: ICD-10-CM

## 2024-06-07 DIAGNOSIS — M54.2 PAIN, NECK: Primary | ICD-10-CM

## 2024-06-07 NOTE — PROGRESS NOTES
"Physical Therapy Daily Treatment Note               [address]                                                                                                                                             [place] [address]    Patient: Jocelyn Lyon   : 1990  Referring practitioner: JOANNA Parham  Date of Initial Visit: Type: THERAPY  Noted: 2024  Today's Date: 2024  Patient seen for 3 sessions       Visit Diagnoses:    ICD-10-CM ICD-9-CM   1. Pain, neck  M54.2 723.1   2. Decreased range of motion of intervertebral discs of cervical spine  M53.82 724.9   3. Decreased strength of upper extremity  R29.898 729.89   4. Poor posture  R29.3 781.92   5. Impaired activities of daily living  Z78.9 V49.89       Subjective Evaluation    History of Present Illness    Subjective comment: Pt reports that her neck is \"much better\". Rates current neck pain as 5/10. (Google translate used)       Objective   See Exercise, Manual, and Modality Logs for complete treatment.   Added spine shoulder flexion, and prone I    Assessment & Plan       Assessment  Assessment details: Pt completed treatment with no provocation of neck pain.  She tolerated the addition of supine shoulder flexion and Prone scapular retractions (prone I) with no issues.  She responds well to moist heat and E stim s/p treatment.  Continue to progress per POC.          Timed:         Manual Therapy:    0     mins  22588;     Therapeutic Exercise:    30     mins  24711;     Neuromuscular Ese:    15    mins  13301;    Therapeutic Activity:     0     mins  03760;     Gait Trainin     mins  89655;     Ultrasound:     0     mins  57822;    E Stim                            15    mins   23051( [id number])  Work [place]/Cond      0    mins   49208        Timed Treatment:   45   mins   Total Treatment:     60   mins    Keshav Stroud PTA  [place] License: [id number]  "

## 2024-06-19 ENCOUNTER — TELEPHONE (OUTPATIENT)
Dept: PHYSICAL THERAPY | Facility: CLINIC | Age: 34
End: 2024-06-19

## 2024-07-26 ENCOUNTER — OFFICE VISIT (OUTPATIENT)
Dept: ENDOCRINOLOGY | Age: 34
End: 2024-07-26
Payer: COMMERCIAL

## 2024-07-26 VITALS
BODY MASS INDEX: 32.49 KG/M2 | HEART RATE: 93 BPM | HEIGHT: 65 IN | SYSTOLIC BLOOD PRESSURE: 114 MMHG | OXYGEN SATURATION: 96 % | TEMPERATURE: 97.9 F | WEIGHT: 195 LBS | DIASTOLIC BLOOD PRESSURE: 78 MMHG

## 2024-07-26 DIAGNOSIS — E23.6 EMPTY SELLA: Primary | ICD-10-CM

## 2024-07-26 NOTE — ASSESSMENT & PLAN NOTE
An empty sella refers to an enlarged sella turcica that is not entirely filled with pituitary tissue. It is a radiologic description and not a clinical condition.   I will get an MRI sella to better evaluate pituitary gland and partially empty sella.   There is no clinical evidence of hormonal excess or deficiencies nonetheless I will get a complete anterior pituitary panel to evaluate HP axis.   Patient denies polyuria or polydipsia consistent with diabetes insipidus.   Patient is attributing her headaches and cervical neck pain to empty sella, explained to patient usually empty sella does not cause severe headaches or neck pain.  Based on pituitary MRI and hormonal workup will decide if neurosurgery evaluation is required

## 2024-07-26 NOTE — PROGRESS NOTES
"Chief Complaint/ Reason for consult:    Empty sella        History of Present Illness      Virtual  was present during the encounter.  Patient referred for further management of empty sella  Noted to have a partial empty sella on MRI cervical spine.  No history of diabetes  No family history of endocrine problems  Patient denies polyuria or polydipsia  Reports increasing the size of her shoes and rings during pregnancy  She has stopped breast-feeding about 2 months ago, still has breast discharge about few drops  Her periods not returned yet-follows with gynecology   Reports headaches associated with eye pain and severe cervical neck pain  Evaluated by orthopedic surgeon for neck pain             Objective   Vital Signs:  /78   Pulse 93   Temp 97.9 °F (36.6 °C) (Oral)   Ht 165.1 cm (65\")   Wt 88.5 kg (195 lb)   SpO2 96%   BMI 32.45 kg/m²   Estimated body mass index is 32.45 kg/m² as calculated from the following:    Height as of this encounter: 165.1 cm (65\").    Weight as of this encounter: 88.5 kg (195 lb).            Physical Exam  Constitutional:       Appearance: She is obese.   HENT:      Head: Normocephalic and atraumatic.   Eyes:      Extraocular Movements: Extraocular movements intact.   Cardiovascular:      Rate and Rhythm: Normal rate and regular rhythm.   Pulmonary:      Effort: Pulmonary effort is normal.      Breath sounds: Normal breath sounds. No wheezing.   Abdominal:      Palpations: Abdomen is soft.      Tenderness: There is no abdominal tenderness.   Musculoskeletal:         General: No swelling. Normal range of motion.      Cervical back: Neck supple. No tenderness.      Comments: No dorsocervical fat pads   Skin:     Comments: No purple striae   Neurological:      Mental Status: She is alert and oriented to person, place, and time.   Psychiatric:         Mood and Affect: Mood normal.        Result Review :  The following data was reviewed by: Mahrokh Nokhbehzaeim, MD on " 07/26/2024:  CMP          10/11/2023    15:05 11/1/2023    08:53   CMP   Glucose 111  124    BUN 11  9    Creatinine 0.80  0.57    EGFR  125.6    Sodium 139  135    Potassium 4.1  3.4    Chloride 105  104    Calcium 9.4  9.1    Total Protein 6.0     Total Protein  6.2    Albumin 3.7  3.2    Globulin 2.3     Globulin  3.0    Total Bilirubin 0.2  0.3    Alkaline Phosphatase 159  157    AST (SGOT) 20  20    ALT (SGPT) 23  23    Albumin/Globulin Ratio  1.1    BUN/Creatinine Ratio 13.8  15.8    Anion Gap  11.3      CMP          10/11/2023    15:05 11/1/2023    08:53   CMP   Glucose 111  124    BUN 11  9    Creatinine 0.80  0.57    EGFR  125.6    Sodium 139  135    Potassium 4.1  3.4    Chloride 105  104    Calcium 9.4  9.1    Total Protein 6.0     Total Protein  6.2    Albumin 3.7  3.2    Globulin 2.3     Globulin  3.0    Total Bilirubin 0.2  0.3    Alkaline Phosphatase 159  157    AST (SGOT) 20  20    ALT (SGPT) 23  23    Albumin/Globulin Ratio  1.1    BUN/Creatinine Ratio 13.8  15.8    Anion Gap  11.3       Data reviewed : Radiologic studies MRI cervical              Assessment and Plan   Diagnoses and all orders for this visit:    1. Empty sella (Primary)  Assessment & Plan:   An empty sella refers to an enlarged sella turcica that is not entirely filled with pituitary tissue. It is a radiologic description and not a clinical condition.   I will get an MRI sella to better evaluate pituitary gland and partially empty sella.   There is no clinical evidence of hormonal excess or deficiencies nonetheless I will get a complete anterior pituitary panel to evaluate HP axis.   Patient denies polyuria or polydipsia consistent with diabetes insipidus.   Patient is attributing her headaches and cervical neck pain to empty sella, explained to patient usually empty sella does not cause severe headaches or neck pain.  Based on pituitary MRI and hormonal workup will decide if neurosurgery evaluation is required    Orders:  -      Comprehensive Metabolic Panel  -     Cortisol  -     Estradiol  -     FSH & LH  -     Prolactin  -     T4, Free  -     TSH  -     Insulin-like Growth Factor-1 (IGF-1) With Z Score  -     MRI Pituitary With & Without Contrast  -     HCG, B-subunit, Quantitative  -     ACTH           I spent 60 minutes caring for Jocelyn on this date of service. This time includes time spent by me in the following activities:preparing for the visit, reviewing tests, obtaining and/or reviewing a separately obtained history, performing a medically appropriate examination and/or evaluation , counseling and educating the patient/family/caregiver, ordering medications, tests, or procedures, and documenting information in the medical record  Follow Up   Return in about 6 months (around 1/26/2025).  Patient was given instructions and counseling regarding her condition or for health maintenance advice. Please see specific information pulled into the AVS if appropriate.

## 2024-07-29 ENCOUNTER — TELEPHONE (OUTPATIENT)
Dept: SURGERY | Facility: CLINIC | Age: 34
End: 2024-07-29
Payer: COMMERCIAL

## 2024-07-29 ENCOUNTER — LAB (OUTPATIENT)
Facility: HOSPITAL | Age: 34
End: 2024-07-29
Payer: COMMERCIAL

## 2024-07-29 LAB
ALBUMIN SERPL-MCNC: 4.4 G/DL (ref 3.5–5.2)
ALBUMIN/GLOB SERPL: 1.5 G/DL
ALP SERPL-CCNC: 106 U/L (ref 39–117)
ALT SERPL W P-5'-P-CCNC: 22 U/L (ref 1–33)
ANION GAP SERPL CALCULATED.3IONS-SCNC: 12.1 MMOL/L (ref 5–15)
AST SERPL-CCNC: 19 U/L (ref 1–32)
BILIRUB SERPL-MCNC: 0.3 MG/DL (ref 0–1.2)
BUN SERPL-MCNC: 14 MG/DL (ref 6–20)
BUN/CREAT SERPL: 23.3 (ref 7–25)
CALCIUM SPEC-SCNC: 9.3 MG/DL (ref 8.6–10.5)
CHLORIDE SERPL-SCNC: 105 MMOL/L (ref 98–107)
CO2 SERPL-SCNC: 24.9 MMOL/L (ref 22–29)
CORTIS SERPL-MCNC: 8.44 MCG/DL
CREAT SERPL-MCNC: 0.6 MG/DL (ref 0.57–1)
EGFRCR SERPLBLD CKD-EPI 2021: 123.2 ML/MIN/1.73
ESTRADIOL SERPL HS-MCNC: 43 PG/ML
FSH SERPL-ACNC: 7.02 MIU/ML
GLOBULIN UR ELPH-MCNC: 2.9 GM/DL
GLUCOSE SERPL-MCNC: 96 MG/DL (ref 65–99)
LH SERPL-ACNC: 7.91 MIU/ML
POTASSIUM SERPL-SCNC: 4.3 MMOL/L (ref 3.5–5.2)
PROLACTIN SERPL-MCNC: 50 NG/ML (ref 4.79–23.3)
PROT SERPL-MCNC: 7.3 G/DL (ref 6–8.5)
SODIUM SERPL-SCNC: 142 MMOL/L (ref 136–145)
T4 FREE SERPL-MCNC: 1.19 NG/DL (ref 0.92–1.68)
TSH SERPL DL<=0.05 MIU/L-ACNC: 2.68 UIU/ML (ref 0.27–4.2)

## 2024-07-29 PROCEDURE — 84443 ASSAY THYROID STIM HORMONE: CPT | Performed by: STUDENT IN AN ORGANIZED HEALTH CARE EDUCATION/TRAINING PROGRAM

## 2024-07-29 PROCEDURE — 84146 ASSAY OF PROLACTIN: CPT | Performed by: STUDENT IN AN ORGANIZED HEALTH CARE EDUCATION/TRAINING PROGRAM

## 2024-07-29 PROCEDURE — 84702 CHORIONIC GONADOTROPIN TEST: CPT | Performed by: STUDENT IN AN ORGANIZED HEALTH CARE EDUCATION/TRAINING PROGRAM

## 2024-07-29 PROCEDURE — 82670 ASSAY OF TOTAL ESTRADIOL: CPT | Performed by: STUDENT IN AN ORGANIZED HEALTH CARE EDUCATION/TRAINING PROGRAM

## 2024-07-29 PROCEDURE — 84439 ASSAY OF FREE THYROXINE: CPT | Performed by: STUDENT IN AN ORGANIZED HEALTH CARE EDUCATION/TRAINING PROGRAM

## 2024-07-29 PROCEDURE — 82024 ASSAY OF ACTH: CPT | Performed by: STUDENT IN AN ORGANIZED HEALTH CARE EDUCATION/TRAINING PROGRAM

## 2024-07-29 PROCEDURE — 80053 COMPREHEN METABOLIC PANEL: CPT | Performed by: STUDENT IN AN ORGANIZED HEALTH CARE EDUCATION/TRAINING PROGRAM

## 2024-07-29 PROCEDURE — 82533 TOTAL CORTISOL: CPT | Performed by: STUDENT IN AN ORGANIZED HEALTH CARE EDUCATION/TRAINING PROGRAM

## 2024-07-29 PROCEDURE — 83001 ASSAY OF GONADOTROPIN (FSH): CPT | Performed by: STUDENT IN AN ORGANIZED HEALTH CARE EDUCATION/TRAINING PROGRAM

## 2024-07-29 PROCEDURE — 83002 ASSAY OF GONADOTROPIN (LH): CPT | Performed by: STUDENT IN AN ORGANIZED HEALTH CARE EDUCATION/TRAINING PROGRAM

## 2024-07-29 PROCEDURE — 84305 ASSAY OF SOMATOMEDIN: CPT | Performed by: STUDENT IN AN ORGANIZED HEALTH CARE EDUCATION/TRAINING PROGRAM

## 2024-07-29 RX ORDER — HYDROCORTISONE 25 MG/G
CREAM TOPICAL
Qty: 30 G | Refills: 1 | Status: SHIPPED | OUTPATIENT
Start: 2024-07-29

## 2024-07-29 NOTE — TELEPHONE ENCOUNTER
Patient requesting refill on Rx - Anusol -HC 2.5% cream be called to her [facility] pharmacy listed in chart.

## 2024-07-30 LAB
ACTH PLAS-MCNC: 11.8 PG/ML (ref 7.2–63.3)
SPECIMEN STATUS: NORMAL

## 2024-07-31 DIAGNOSIS — E23.6 EMPTY SELLA: Primary | ICD-10-CM

## 2024-07-31 DIAGNOSIS — R79.89 ELEVATED PROLACTIN LEVEL: ICD-10-CM

## 2024-07-31 LAB
HCG INTACT+B SERPL-ACNC: <1 MIU/ML
IGF-I SERPL-MCNC: 160 NG/ML (ref 84–281)
IGF-I Z-SCORE SERPL: -0.2 S.D.

## 2024-08-08 ENCOUNTER — DOCUMENTATION (OUTPATIENT)
Dept: PHYSICAL THERAPY | Facility: CLINIC | Age: 34
End: 2024-08-08
Payer: COMMERCIAL

## 2025-04-01 ENCOUNTER — OFFICE VISIT (OUTPATIENT)
Dept: OBSTETRICS AND GYNECOLOGY | Facility: CLINIC | Age: 35
End: 2025-04-01
Payer: MEDICAID

## 2025-04-01 VITALS — BODY MASS INDEX: 28.96 KG/M2 | DIASTOLIC BLOOD PRESSURE: 81 MMHG | WEIGHT: 174 LBS | SYSTOLIC BLOOD PRESSURE: 120 MMHG

## 2025-04-01 DIAGNOSIS — Z01.419 WELL WOMAN EXAM WITH ROUTINE GYNECOLOGICAL EXAM: Primary | ICD-10-CM

## 2025-04-01 DIAGNOSIS — O92.70 LACTATION DISORDER: ICD-10-CM

## 2025-04-01 NOTE — PROGRESS NOTES
Patient or patient representative verbalized consent for the use of Ambient Listening during the visit with  Bethany Montoya MD for chart documentation. 2025  12:28 EDT    History of Present Illness  The patient is a 34-year-old female, [id number], here for her annual exam. She was last seen in 2024 after undergoing a laparoscopic bilateral salpingectomy for sterilization. Her Pap smear in 2023 was normal. She recently had an evaluation with endocrinology for empty sella syndrome and an evaluation by colorectal for hemorrhoids.    Irregular Menstruation  - Reports irregular menstruation, with her last period in 2024 and no period in 2024  - Has not had a period in 2024 yet  - Periods typically last 3 to 5 days  - No risk of pregnancy as she had a sterilization procedure and has not recently had intercourse per her report because she had an abdominoplasty over one month ago in [place] and has been recovering    Nipple Discharge  - Reports spontaneous nipple discharge that is not pink or green  - Stopped breastfeeding at 6 months postpartum    Surgical History  - Underwent laparoscopic bilateral salpingectomy for sterilization in 2024  - Had surgery >1 month ago in [place] for abdominoplasty, worried about incision    Supplemental information: She has not engaged in sexual activity recently due to planning for abdominal plastic surgery. She has not had a mammogram. Blood was drawn at her last endocrinology appointment 2024. She reports no vaginal discharge or itching. Her last cytological test was normal. No h/o abnormal pap smears. She is applying for medical insurance but has not been approved yet.    FAMILY HISTORY  No breast/ovarian/uterine cancers      Vitals:    25 1119   BP: 120/81       Physical Exam  Exam conducted with a chaperone present.   Constitutional:       General: She is not in acute distress.     Appearance: She is well-developed. She is not  diaphoretic.   HENT:      Head: Normocephalic and atraumatic.   Neck:      Thyroid: No thyromegaly.      Trachea: No tracheal deviation.   Cardiovascular:      Rate and Rhythm: Normal rate.      Heart sounds: Normal heart sounds. No murmur heard.     No friction rub. No gallop.   Pulmonary:      Effort: Pulmonary effort is normal. No respiratory distress.      Breath sounds: Normal breath sounds.   Chest:      Chest wall: No tenderness.   Breasts:     Right: No inverted nipple, mass, nipple discharge, skin change or tenderness.      Left: No inverted nipple, mass, nipple discharge, skin change or tenderness.   Abdominal:      General: There is no distension.      Palpations: Abdomen is soft. There is no mass.      Tenderness: There is no abdominal tenderness.      Comments: Healing scar along lower abdomen with some granulation tissue in midline, no evidence of infection at this time   Genitourinary:     General: Normal vulva.      Labia:         Right: No rash, lesion or injury.         Left: No rash, lesion or injury.       Vagina: No vaginal discharge, tenderness or bleeding.      Cervix: No cervical motion tenderness, discharge or friability.      Uterus: Not deviated, not enlarged, not fixed and not tender.       Adnexa:         Right: No mass, tenderness or fullness.          Left: No mass, tenderness or fullness.     Musculoskeletal:         General: No deformity. Normal range of motion.   Lymphadenopathy:      Cervical: No cervical adenopathy.   Skin:     General: Skin is warm and dry.      Findings: No rash.   Neurological:      Mental Status: She is alert and oriented to person, place, and time.   Psychiatric:         Behavior: Behavior normal.         Thought Content: Thought content normal.         Judgment: Judgment normal.           Results       Diagnosis Plan   1. Well woman exam with routine gynecological exam        2. Lactation disorder  Prolactin    FSH & LH    Estradiol          Assessment &  Plan  1. Irregular menstruation:   - Recommended blood tests to check hormone levels including FSH, LH, estrogen, and prolactin.  - Discussed the possibility of pregnancy despite sterilization and recommended a urine pregnancy test. Due to cost she would prefer to do this at home  - If prolactin levels are high, medication may be needed to lower them.    2. Spontaneous nipple discharge:   - Recommended repeating prolactin hormone test.  - consider imaging or follow up pending labs    3. Post-surgical wound care:   - Wound appears to be healing  - Advised to monitor for signs of infection such as fever or increased redness.    4. Routine health maintenance:  - pap smear due in 2026      PROCEDURE  Laparoscopic bilateral salpingectomy for sterilization in March 2024.      Bethany Montoya MD  04/01/25 12:28 EDT

## 2025-04-02 ENCOUNTER — RESULTS FOLLOW-UP (OUTPATIENT)
Dept: OBSTETRICS AND GYNECOLOGY | Facility: CLINIC | Age: 35
End: 2025-04-02
Payer: MEDICAID

## 2025-04-02 DIAGNOSIS — E22.1 HYPERPROLACTINEMIA: Primary | ICD-10-CM

## 2025-04-02 LAB
ESTRADIOL SERPL-MCNC: 177 PG/ML
FSH SERPL-ACNC: 8.2 MIU/ML
LH SERPL-ACNC: 18.4 MIU/ML
PROLACTIN SERPL-MCNC: 53.9 NG/ML (ref 4.8–33.4)

## 2025-04-02 NOTE — TELEPHONE ENCOUNTER
Please let patient know that her LH and FSH as well as estradiol levels were normal.  Her prolactin is still elevated.  I would recommend follow up with Dr. Nokhbehzaeim (Endocrinology).  She likely needs the MRI that was recommended as it appears this was never done.  I know she is having difficulty with insurance, but I would strongly encourage her to follow up with endocrinology.

## 2025-04-09 ENCOUNTER — TELEPHONE (OUTPATIENT)
Dept: OBSTETRICS AND GYNECOLOGY | Facility: CLINIC | Age: 35
End: 2025-04-09

## 2025-04-09 NOTE — TELEPHONE ENCOUNTER
Hub staff attempted to follow warm transfer process and was unsuccessful     Caller: Jocelyn Horn    Relationship to patient: Self    Best call back number: [phone] / LVM    Patient is needing: PT IS WANTING TO SPEAK WITH DR DIAZ ABOUT THE VISIT SHE HAD ON 04/01/25 - PT IS STILL HAVING SOME SWELLING AND WANTS TO MAKE SURE THAT IS OK OR SHOULD SHE BE CONCERNED AND BE SEEN AGAIN     THANK YOU!

## 2025-08-25 RX ORDER — HYDROCORTISONE 25 MG/G
CREAM TOPICAL
Qty: 30 G | Refills: 1 | OUTPATIENT
Start: 2025-08-25

## (undated) DEVICE — GLV SURG SENSICARE POLYISPRN W/ALOE PF LF 6.5 GRN STRL

## (undated) DEVICE — LAPAROSCOPIC SMOKE FILTRATION SYSTEM: Brand: PALL LAPAROSHIELD® PLUS LAPAROSCOPIC SMOKE FILTRATION SYSTEM

## (undated) DEVICE — LOU GYN LAPAROSCOPY: Brand: MEDLINE INDUSTRIES, INC.

## (undated) DEVICE — ENDOPATH XCEL DILATING TIP TROCARS WITH STABILITY SLEEVES: Brand: ENDOPATH XCEL

## (undated) DEVICE — ADHS SKIN SURG TISS VISC PREMIERPRO EXOFIN HI/VISC FAST/DRY

## (undated) DEVICE — APPL CHLORAPREP HI/LITE 26ML ORNG

## (undated) DEVICE — SOL NACL 0.9PCT 1000ML

## (undated) DEVICE — ANTIBACTERIAL UNDYED BRAIDED (POLYGLACTIN 910), SYNTHETIC ABSORBABLE SUTURE: Brand: COATED VICRYL

## (undated) DEVICE — HARMONIC ACE +7 LAPAROSCOPIC SHEARS ADVANCED HEMOSTASIS 5MM DIAMETER 36CM SHAFT LENGTH  FOR USE WITH GRAY HAND PIECE ONLY: Brand: HARMONIC ACE

## (undated) DEVICE — ENDOPATH XCEL BLUNT TIP TROCARS WITH SMOOTH SLEEVES: Brand: ENDOPATH XCEL

## (undated) DEVICE — GLV SURG BIOGEL LTX PF 6

## (undated) DEVICE — SUT VIC 0/0 UR6 27IN DYED J603H